# Patient Record
Sex: FEMALE | Race: WHITE | NOT HISPANIC OR LATINO | Employment: FULL TIME | URBAN - METROPOLITAN AREA
[De-identification: names, ages, dates, MRNs, and addresses within clinical notes are randomized per-mention and may not be internally consistent; named-entity substitution may affect disease eponyms.]

---

## 2017-01-05 ENCOUNTER — GENERIC CONVERSION - ENCOUNTER (OUTPATIENT)
Dept: OTHER | Facility: OTHER | Age: 22
End: 2017-01-05

## 2017-01-20 RX ORDER — SODIUM CHLORIDE 9 MG/ML
20 INJECTION, SOLUTION INTRAVENOUS ONCE
Status: COMPLETED | OUTPATIENT
Start: 2017-01-21 | End: 2017-01-21

## 2017-01-21 ENCOUNTER — HOSPITAL ENCOUNTER (OUTPATIENT)
Dept: INFUSION CENTER | Facility: HOSPITAL | Age: 22
Discharge: HOME | End: 2017-01-21
Payer: COMMERCIAL

## 2017-01-21 VITALS
DIASTOLIC BLOOD PRESSURE: 70 MMHG | TEMPERATURE: 98.2 F | RESPIRATION RATE: 20 BRPM | HEART RATE: 82 BPM | WEIGHT: 121.03 LBS | SYSTOLIC BLOOD PRESSURE: 116 MMHG

## 2017-01-21 PROCEDURE — 96415 CHEMO IV INFUSION ADDL HR: CPT

## 2017-01-21 PROCEDURE — 96413 CHEMO IV INFUSION 1 HR: CPT

## 2017-01-21 RX ADMIN — INFLIXIMAB 271 MG: 100 INJECTION, POWDER, LYOPHILIZED, FOR SOLUTION INTRAVENOUS at 09:18

## 2017-01-21 RX ADMIN — SODIUM CHLORIDE 20 ML/HR: 0.9 INJECTION, SOLUTION INTRAVENOUS at 09:21

## 2017-01-21 NOTE — PLAN OF CARE
Problem: Potential for Falls  Goal: Patient will remain free of falls  INTERVENTIONS:  - Assess patient frequently for physical needs  - Identify cognitive and physical deficits and behaviors that affect risk of falls    - [facility] [place] fall precautions as indicated by assessment   - Educate patient/family on patient safety including physical limitations  - Instruct patient to call for assistance with activity based on assessment  - Modify environment to reduce risk of injury  - Consider OT/PT consult to assist with strengthening/mobility   Outcome: Progressing

## 2017-03-18 ENCOUNTER — HOSPITAL ENCOUNTER (OUTPATIENT)
Dept: INFUSION CENTER | Facility: HOSPITAL | Age: 22
Discharge: HOME | End: 2017-03-18
Payer: COMMERCIAL

## 2017-03-18 VITALS
DIASTOLIC BLOOD PRESSURE: 58 MMHG | WEIGHT: 123 LBS | SYSTOLIC BLOOD PRESSURE: 113 MMHG | RESPIRATION RATE: 18 BRPM | TEMPERATURE: 96.4 F | HEART RATE: 88 BPM

## 2017-03-18 LAB
ALBUMIN SERPL BCP-MCNC: 3.3 G/DL (ref 3.5–5)
ALP SERPL-CCNC: 61 U/L (ref 46–116)
ALT SERPL W P-5'-P-CCNC: 24 U/L (ref 12–78)
ANION GAP SERPL CALCULATED.3IONS-SCNC: 6 MMOL/L (ref 4–13)
AST SERPL W P-5'-P-CCNC: 17 U/L (ref 5–45)
BASOPHILS # BLD AUTO: 0.05 THOUSANDS/ΜL (ref 0–0.1)
BASOPHILS NFR BLD AUTO: 1 % (ref 0–1)
BILIRUB SERPL-MCNC: 0.22 MG/DL (ref 0.2–1)
BUN SERPL-MCNC: 10 MG/DL (ref 5–25)
CALCIUM SERPL-MCNC: 8.5 MG/DL (ref 8.3–10.1)
CHLORIDE SERPL-SCNC: 106 MMOL/L (ref 100–108)
CO2 SERPL-SCNC: 26 MMOL/L (ref 21–32)
CREAT SERPL-MCNC: 0.82 MG/DL (ref 0.6–1.3)
CRP SERPL QL: 10.6 MG/L
EOSINOPHIL # BLD AUTO: 0.1 THOUSAND/ΜL (ref 0–0.61)
EOSINOPHIL NFR BLD AUTO: 1 % (ref 0–6)
ERYTHROCYTE [DISTWIDTH] IN BLOOD BY AUTOMATED COUNT: 16.2 % (ref 11.6–15.1)
FERRITIN SERPL-MCNC: 4 NG/ML (ref 8–388)
GFR SERPL CREATININE-BSD FRML MDRD: >60 ML/MIN/1.73SQ M
GLUCOSE SERPL-MCNC: 81 MG/DL (ref 65–140)
HCT VFR BLD AUTO: 35.7 % (ref 34.8–46.1)
HGB BLD-MCNC: 11.3 G/DL (ref 11.5–15.4)
LYMPHOCYTES # BLD AUTO: 3.13 THOUSANDS/ΜL (ref 0.6–4.47)
LYMPHOCYTES NFR BLD AUTO: 41 % (ref 14–44)
MCH RBC QN AUTO: 26.1 PG (ref 26.8–34.3)
MCHC RBC AUTO-ENTMCNC: 31.7 G/DL (ref 31.4–37.4)
MCV RBC AUTO: 82 FL (ref 82–98)
MONOCYTES # BLD AUTO: 0.88 THOUSAND/ΜL (ref 0.17–1.22)
MONOCYTES NFR BLD AUTO: 12 % (ref 4–12)
NEUTROPHILS # BLD AUTO: 3.5 THOUSANDS/ΜL (ref 1.85–7.62)
NEUTS SEG NFR BLD AUTO: 45 % (ref 43–75)
NRBC BLD AUTO-RTO: 0 /100 WBCS
PLATELET # BLD AUTO: 272 THOUSANDS/UL (ref 149–390)
PMV BLD AUTO: 9.7 FL (ref 8.9–12.7)
POTASSIUM SERPL-SCNC: 3.9 MMOL/L (ref 3.5–5.3)
PROT SERPL-MCNC: 7.9 G/DL (ref 6.4–8.2)
RBC # BLD AUTO: 4.33 MILLION/UL (ref 3.81–5.12)
SODIUM SERPL-SCNC: 138 MMOL/L (ref 136–145)
WBC # BLD AUTO: 7.67 THOUSAND/UL (ref 4.31–10.16)

## 2017-03-18 PROCEDURE — 96415 CHEMO IV INFUSION ADDL HR: CPT

## 2017-03-18 PROCEDURE — 86140 C-REACTIVE PROTEIN: CPT | Performed by: INTERNAL MEDICINE

## 2017-03-18 PROCEDURE — 82728 ASSAY OF FERRITIN: CPT | Performed by: INTERNAL MEDICINE

## 2017-03-18 PROCEDURE — 80053 COMPREHEN METABOLIC PANEL: CPT | Performed by: INTERNAL MEDICINE

## 2017-03-18 PROCEDURE — 96413 CHEMO IV INFUSION 1 HR: CPT

## 2017-03-18 PROCEDURE — 85025 COMPLETE CBC W/AUTO DIFF WBC: CPT | Performed by: INTERNAL MEDICINE

## 2017-03-18 RX ORDER — SODIUM CHLORIDE 9 MG/ML
20 INJECTION, SOLUTION INTRAVENOUS ONCE
Status: COMPLETED | OUTPATIENT
Start: 2017-03-18 | End: 2017-03-18

## 2017-03-18 RX ADMIN — INFLIXIMAB 279.5 MG: 100 INJECTION, POWDER, LYOPHILIZED, FOR SOLUTION INTRAVENOUS at 09:59

## 2017-03-18 RX ADMIN — SODIUM CHLORIDE 20 ML/HR: 0.9 INJECTION, SOLUTION INTRAVENOUS at 09:45

## 2017-04-17 ENCOUNTER — ALLSCRIPTS OFFICE VISIT (OUTPATIENT)
Dept: OTHER | Facility: OTHER | Age: 22
End: 2017-04-17

## 2017-05-01 ENCOUNTER — GENERIC CONVERSION - ENCOUNTER (OUTPATIENT)
Dept: OTHER | Facility: OTHER | Age: 22
End: 2017-05-01

## 2017-05-12 RX ORDER — SODIUM CHLORIDE 9 MG/ML
20 INJECTION, SOLUTION INTRAVENOUS CONTINUOUS
Status: DISCONTINUED | OUTPATIENT
Start: 2017-05-13 | End: 2017-05-16 | Stop reason: HOSPADM

## 2017-05-13 ENCOUNTER — HOSPITAL ENCOUNTER (OUTPATIENT)
Dept: INFUSION CENTER | Facility: HOSPITAL | Age: 22
Discharge: HOME | End: 2017-05-13
Payer: COMMERCIAL

## 2017-05-13 VITALS
HEART RATE: 83 BPM | WEIGHT: 118.83 LBS | DIASTOLIC BLOOD PRESSURE: 58 MMHG | TEMPERATURE: 97.8 F | SYSTOLIC BLOOD PRESSURE: 102 MMHG | RESPIRATION RATE: 18 BRPM

## 2017-05-13 PROCEDURE — 96413 CHEMO IV INFUSION 1 HR: CPT

## 2017-05-13 PROCEDURE — 96415 CHEMO IV INFUSION ADDL HR: CPT

## 2017-05-13 RX ADMIN — INFLIXIMAB 269.5 MG: 100 INJECTION, POWDER, LYOPHILIZED, FOR SOLUTION INTRAVENOUS at 09:50

## 2017-05-13 RX ADMIN — SODIUM CHLORIDE 20 ML/HR: 0.9 INJECTION, SOLUTION INTRAVENOUS at 09:40

## 2017-05-22 ENCOUNTER — ALLSCRIPTS OFFICE VISIT (OUTPATIENT)
Dept: OTHER | Facility: OTHER | Age: 22
End: 2017-05-22

## 2017-07-06 RX ORDER — SODIUM CHLORIDE 9 MG/ML
20 INJECTION, SOLUTION INTRAVENOUS CONTINUOUS
Status: DISCONTINUED | OUTPATIENT
Start: 2017-07-08 | End: 2017-07-11 | Stop reason: HOSPADM

## 2017-07-08 ENCOUNTER — HOSPITAL ENCOUNTER (OUTPATIENT)
Dept: INFUSION CENTER | Facility: HOSPITAL | Age: 22
Discharge: HOME | End: 2017-07-08
Payer: COMMERCIAL

## 2017-07-08 VITALS
TEMPERATURE: 98.8 F | WEIGHT: 116.62 LBS | RESPIRATION RATE: 22 BRPM | SYSTOLIC BLOOD PRESSURE: 105 MMHG | DIASTOLIC BLOOD PRESSURE: 75 MMHG | HEART RATE: 82 BPM

## 2017-07-08 PROCEDURE — 96413 CHEMO IV INFUSION 1 HR: CPT

## 2017-07-08 PROCEDURE — 96415 CHEMO IV INFUSION ADDL HR: CPT

## 2017-07-08 RX ADMIN — SODIUM CHLORIDE 20 ML/HR: 0.9 INJECTION, SOLUTION INTRAVENOUS at 09:41

## 2017-07-08 RX ADMIN — INFLIXIMAB 264.5 MG: 100 INJECTION, POWDER, LYOPHILIZED, FOR SOLUTION INTRAVENOUS at 09:41

## 2017-07-08 NOTE — PLAN OF CARE
Problem: Potential for Falls  Goal: Patient will remain free of falls  INTERVENTIONS:  - Assess patient frequently for physical needs  -  Identify cognitive and physical deficits and behaviors that affect risk of falls    -  [place] fall precautions as indicated by assessment   - Educate patient/family on patient safety including physical limitations  - Instruct patient to call for assistance with activity based on assessment  - Modify environment to reduce risk of injury  - Consider OT/PT consult to assist with strengthening/mobility   Outcome: Progressing

## 2017-08-10 ENCOUNTER — GENERIC CONVERSION - ENCOUNTER (OUTPATIENT)
Dept: OTHER | Facility: OTHER | Age: 22
End: 2017-08-10

## 2017-09-02 ENCOUNTER — HOSPITAL ENCOUNTER (OUTPATIENT)
Dept: INFUSION CENTER | Facility: HOSPITAL | Age: 22
Discharge: HOME | End: 2017-09-02
Payer: COMMERCIAL

## 2017-09-02 VITALS
HEART RATE: 75 BPM | DIASTOLIC BLOOD PRESSURE: 62 MMHG | SYSTOLIC BLOOD PRESSURE: 95 MMHG | WEIGHT: 117.5 LBS | TEMPERATURE: 97.3 F | RESPIRATION RATE: 18 BRPM

## 2017-09-02 PROCEDURE — 96415 CHEMO IV INFUSION ADDL HR: CPT

## 2017-09-02 PROCEDURE — 96413 CHEMO IV INFUSION 1 HR: CPT

## 2017-09-02 RX ADMIN — INFLIXIMAB 266.5 MG: 100 INJECTION, POWDER, LYOPHILIZED, FOR SOLUTION INTRAVENOUS at 08:39

## 2017-10-26 RX ORDER — SODIUM CHLORIDE 9 MG/ML
20 INJECTION, SOLUTION INTRAVENOUS CONTINUOUS
Status: DISCONTINUED | OUTPATIENT
Start: 2017-10-28 | End: 2017-10-31 | Stop reason: HOSPADM

## 2017-10-28 ENCOUNTER — HOSPITAL ENCOUNTER (OUTPATIENT)
Dept: INFUSION CENTER | Facility: HOSPITAL | Age: 22
Discharge: HOME | End: 2017-10-28
Payer: COMMERCIAL

## 2017-10-28 VITALS
SYSTOLIC BLOOD PRESSURE: 114 MMHG | DIASTOLIC BLOOD PRESSURE: 57 MMHG | TEMPERATURE: 97.8 F | WEIGHT: 117.06 LBS | RESPIRATION RATE: 20 BRPM | HEART RATE: 70 BPM

## 2017-10-28 LAB
25(OH)D3 SERPL-MCNC: 23.1 NG/ML (ref 30–100)
ALBUMIN SERPL BCP-MCNC: 3.3 G/DL (ref 3.5–5)
ALP SERPL-CCNC: 60 U/L (ref 46–116)
ALT SERPL W P-5'-P-CCNC: 22 U/L (ref 12–78)
ANION GAP SERPL CALCULATED.3IONS-SCNC: 5 MMOL/L (ref 4–13)
AST SERPL W P-5'-P-CCNC: 40 U/L (ref 5–45)
BASOPHILS # BLD AUTO: 0.03 THOUSANDS/ΜL (ref 0–0.1)
BASOPHILS NFR BLD AUTO: 0 % (ref 0–1)
BILIRUB SERPL-MCNC: 0.46 MG/DL (ref 0.2–1)
BUN SERPL-MCNC: 9 MG/DL (ref 5–25)
CALCIUM SERPL-MCNC: 8.5 MG/DL (ref 8.3–10.1)
CHLORIDE SERPL-SCNC: 106 MMOL/L (ref 100–108)
CO2 SERPL-SCNC: 25 MMOL/L (ref 21–32)
CREAT SERPL-MCNC: 0.69 MG/DL (ref 0.6–1.3)
CRP SERPL QL: 5.3 MG/L
EOSINOPHIL # BLD AUTO: 0.08 THOUSAND/ΜL (ref 0–0.61)
EOSINOPHIL NFR BLD AUTO: 1 % (ref 0–6)
ERYTHROCYTE [DISTWIDTH] IN BLOOD BY AUTOMATED COUNT: 15.7 % (ref 11.6–15.1)
FERRITIN SERPL-MCNC: 6 NG/ML (ref 8–388)
GFR SERPL CREATININE-BSD FRML MDRD: 125 ML/MIN/1.73SQ M
GLUCOSE P FAST SERPL-MCNC: 76 MG/DL (ref 65–99)
GLUCOSE SERPL-MCNC: 76 MG/DL (ref 65–140)
HCT VFR BLD AUTO: 36.6 % (ref 34.8–46.1)
HGB BLD-MCNC: 12 G/DL (ref 11.5–15.4)
LYMPHOCYTES # BLD AUTO: 3.06 THOUSANDS/ΜL (ref 0.6–4.47)
LYMPHOCYTES NFR BLD AUTO: 39 % (ref 14–44)
MCH RBC QN AUTO: 27.4 PG (ref 26.8–34.3)
MCHC RBC AUTO-ENTMCNC: 32.8 G/DL (ref 31.4–37.4)
MCV RBC AUTO: 84 FL (ref 82–98)
MONOCYTES # BLD AUTO: 0.67 THOUSAND/ΜL (ref 0.17–1.22)
MONOCYTES NFR BLD AUTO: 9 % (ref 4–12)
NEUTROPHILS # BLD AUTO: 3.97 THOUSANDS/ΜL (ref 1.85–7.62)
NEUTS SEG NFR BLD AUTO: 51 % (ref 43–75)
NRBC BLD AUTO-RTO: 0 /100 WBCS
PLATELET # BLD AUTO: 345 THOUSANDS/UL (ref 149–390)
PMV BLD AUTO: 9.4 FL (ref 8.9–12.7)
POTASSIUM SERPL-SCNC: 5 MMOL/L (ref 3.5–5.3)
PROT SERPL-MCNC: 8.6 G/DL (ref 6.4–8.2)
RBC # BLD AUTO: 4.38 MILLION/UL (ref 3.81–5.12)
SODIUM SERPL-SCNC: 136 MMOL/L (ref 136–145)
WBC # BLD AUTO: 7.82 THOUSAND/UL (ref 4.31–10.16)

## 2017-10-28 PROCEDURE — 96415 CHEMO IV INFUSION ADDL HR: CPT

## 2017-10-28 PROCEDURE — 82306 VITAMIN D 25 HYDROXY: CPT | Performed by: INTERNAL MEDICINE

## 2017-10-28 PROCEDURE — 80053 COMPREHEN METABOLIC PANEL: CPT | Performed by: INTERNAL MEDICINE

## 2017-10-28 PROCEDURE — 96413 CHEMO IV INFUSION 1 HR: CPT

## 2017-10-28 PROCEDURE — 82728 ASSAY OF FERRITIN: CPT | Performed by: INTERNAL MEDICINE

## 2017-10-28 PROCEDURE — 85025 COMPLETE CBC W/AUTO DIFF WBC: CPT | Performed by: INTERNAL MEDICINE

## 2017-10-28 PROCEDURE — 86140 C-REACTIVE PROTEIN: CPT | Performed by: INTERNAL MEDICINE

## 2017-10-28 RX ADMIN — SODIUM CHLORIDE 20 ML/HR: 0.9 INJECTION, SOLUTION INTRAVENOUS at 08:15

## 2017-10-28 RX ADMIN — INFLIXIMAB 265.5 MG: 100 INJECTION, POWDER, LYOPHILIZED, FOR SOLUTION INTRAVENOUS at 09:01

## 2017-10-28 NOTE — PLAN OF CARE
Problem: Potential for Falls  Goal: Patient will remain free of falls  INTERVENTIONS:  - Assess patient frequently for physical needs  -  Identify cognitive and physical deficits and behaviors that affect risk of falls    -  [place] fall precautions as indicated by assessment   - Educate patient/family on patient safety including physical limitations  - Instruct patient to call for assistance with activity based on assessment  - Modify environment to reduce risk of injury  - Consider OT/PT consult to assist with strengthening/mobility   Outcome: Progressing

## 2017-12-14 ENCOUNTER — GENERIC CONVERSION - ENCOUNTER (OUTPATIENT)
Dept: FAMILY MEDICINE CLINIC | Facility: CLINIC | Age: 22
End: 2017-12-14

## 2017-12-22 RX ORDER — SODIUM CHLORIDE 9 MG/ML
20 INJECTION, SOLUTION INTRAVENOUS ONCE
Status: COMPLETED | OUTPATIENT
Start: 2017-12-23 | End: 2017-12-23

## 2017-12-23 ENCOUNTER — HOSPITAL ENCOUNTER (OUTPATIENT)
Dept: INFUSION CENTER | Facility: HOSPITAL | Age: 22
Discharge: HOME | End: 2017-12-25
Payer: COMMERCIAL

## 2017-12-23 VITALS
SYSTOLIC BLOOD PRESSURE: 109 MMHG | HEART RATE: 70 BPM | TEMPERATURE: 96.7 F | RESPIRATION RATE: 16 BRPM | DIASTOLIC BLOOD PRESSURE: 63 MMHG

## 2017-12-23 PROCEDURE — 96415 CHEMO IV INFUSION ADDL HR: CPT

## 2017-12-23 PROCEDURE — 96413 CHEMO IV INFUSION 1 HR: CPT

## 2017-12-23 RX ADMIN — SODIUM CHLORIDE 20 ML/HR: 0.9 INJECTION, SOLUTION INTRAVENOUS at 08:50

## 2017-12-23 RX ADMIN — INFLIXIMAB 266.5 MG: 100 INJECTION, POWDER, LYOPHILIZED, FOR SOLUTION INTRAVENOUS at 09:06

## 2018-01-13 NOTE — PROGRESS NOTES
Assessment    1  Encounter for preventive health examination (V70 0) (Z00 00)    Plan  Contraception management    · Norethin Ace-Eth Estrad-FE 1-20 MG-MCG Oral Tablet    Discussion/Summary  health maintenance visit Currently, she eats a healthy diet and has an adequate exercise regimen  next cervical cancer screening is due 3 years cervical cancer screening is managed by [facility] Breast cancer screening: breast cancer screening is not indicated  Colorectal cancer screening: colorectal cancer screening is not indicated  Osteoporosis screening: bone mineral density testing is not indicated  The immunizations are up to date  Advice and education were given regarding nutrition, aerobic exercise, reproductive health and contraception  Patient discussion: discussed with the patient  1  Health maintenance  -doing well, no acute concerns  -UTD on vaccines, UTD on pap test   -follow up in 1 year for annual physical or sooner if problems arise  The patient was counseled regarding instructions for management, risk factor reductions, patient and family education, impressions, risks and benefits of treatment options, importance of compliance with treatment  The treatment plan was reviewed with the patient/guardian  The patient/guardian understands and agrees with the treatment plan     Self Referrals: Yes [facility]      Chief Complaint  annual physical      History of Present Illness  HM, Adult Female: The patient is being seen for a health maintenance evaluation  General Health: The patient's health since the last visit is described as good  She has regular dental visits  She denies vision problems  She denies hearing loss  Immunizations status: up to date   HPV through the office a few years ago  Lifestyle:  She consumes a diverse and healthy diet  She does not have any weight concerns  She exercises regularly  She does not use tobacco  She denies alcohol use  She denies drug use  Reproductive health: the patient is premenopausal   she reports normal menses  she uses contraception  she is sexually active  she denies prior pregnancies G 0P 0, 0, 0, 0  Screening: cancer screening reviewed and current  HPI: Patient is a pleasant 25 yo F presenting for annual physical  Patient has no acute complaints - denies CP, SOB, n/v/c/d, HA, or dizziness  She follows with [facility] for her GYN care, had a pap test earlier this year  She denies family h/o breast, ovarian, and colon cancer  UTD on vaccines  Exercises regularly and eats balanced diet  Patient is sexually active and takes OCPs for birth control  Patient has a h/o of iron deficiency anemia in which she takes iron supplement daily and takes Flonase for seasonal allergies  Otherwise, she takes no other medications  Review of Systems    Constitutional: no fever and no chills  Cardiovascular: no chest pain and no palpitations  Respiratory: no shortness of breath, no cough, no wheezing and no shortness of breath during exertion  Gastrointestinal: no abdominal pain, no nausea, no constipation and no diarrhea  Genitourinary: no dysmenorrhea and no unexplained vaginal bleeding  Musculoskeletal: no arthralgias and no myalgias  Integumentary: no rashes and no skin lesions  Neurological: no headache, no numbness, no fainting and no difficulty walking  ROS reviewed  Active Problems    1  Acute sinus infection (461 9) (J01 90)   2  Asthma (493 90) (J45 909)   3  Congested (478 19) (R09 81)   4  Contraception management (V25 9) (Z30 9)   5  Crohn's disease (555 9) (K50 90)   6  Encounter for repeat prescription of oral contraceptives (V25 01) (Z30 41)   7  Enlarged lymph node (785 6) (R59 9)   8  Eustachian tube dysfunction (381 81) (H69 80)   9  Impetigo (684) (L01 00)   10  Need for vaccination with 13-polyvalent pneumococcal conjugate vaccine (V03 82) (Z23)   11  Oral contraceptive use (V25 41) (Z30 41)   12  Tuberculosis screening (V74 1) (Z11 1)   13  URTI (acute upper respiratory infection) (465 9) (J06 9)    Past Medical History    · History of Abrasion Of The Left Cornea (918 1)   · History of Asthma (493 90) (J45 909)   · History of Cough (786 2) (R05)   · History of Dry Eye Syndrome Of The Right Eye (375 15)   · Encounter for repeat prescription of oral contraceptives (V25 01) (Z30 41)   · History of Crohn's disease (V12 70) (Z87 19)   · History of dermatitis (V13 3) (Z87 2)    Surgical History    · Denied: History Of Prior Surgery    Family History  Mother    · Family history of Crohn's (Granulomatous) Colitis  Father    · Family history of Spinal Stenosis    Social History    · Never A Smoker   · Never Drank Alcohol   · Oral contraceptive use (V25 41) (Z30 41)    Current Meds   1  Ferrous Sulfate 325 (65 Fe) MG Oral Tablet; TAKE 1 TABLET DAILY AS DIRECTED; Therapy: 46WEZ1230 to (Evaluate:18Nov2017) Recorded   2  Fluticasone Propionate 50 MCG/ACT Nasal Suspension; USE 1 TO 2 SPRAYS IN EACH   NOSTRIL ONCE DAILY; Therapy: 81BPF7137 to (Last Rx:07Mpr8473)  Requested for: 56Ped0102 Ordered   3  Microgestin FE 1/20 1-20 MG-MCG Oral Tablet; take (1) tablet daily; Therapy: 11Aug2015 to (Last Rx:23Gzs9301)  Requested for: 72QGI6422 Ordered   4  Norethin Ace-Eth Estrad-FE 1-20 MG-MCG Oral Tablet; TAKE 1 TABLET DAILY FOR 21   DAYS, THEN 7 TABLET-FREE DAYS; REPEAT; Therapy: 79IZW3706 to (490 41 485)  Requested for: 31Oct2016; Last   Rx:[id number] Ordered    Allergies    1  Sulfa Drugs    Vitals   Recorded: 98PBR6331 05:17PM   Temperature 98 4 F, Tympanic   Heart Rate 72   Respiration 14   Systolic 042, RUE, Sitting   Diastolic 60, RUE, Sitting   Height 5 ft 2 in   Weight 117 lb 8 oz   BMI Calculated 21 49   BSA Calculated 1 52   Pain Scale 0     Physical Exam    Constitutional   General appearance: No acute distress, well appearing and well nourished      Head and Face   Head and face: Normal     Eyes Conjunctiva and lids: No swelling, erythema or discharge  Ears, Nose, Mouth, and Throat   External inspection of ears and nose: Normal     Lips, teeth, and gums: Normal, good dentition  Oropharynx: Normal with no erythema, edema, exudate or lesions  Neck   Neck: Supple, symmetric, trachea midline, no masses  Cardiovascular   Auscultation of heart: Normal rate and rhythm, normal S1 and S2, no murmurs  Pedal pulses: 2+ bilaterally  Peripheral vascular exam: Normal     Examination of extremities for edema and/or varicosities: Normal     Abdomen   Abdomen: Non-tender, no masses  Lymphatic   Palpation of lymph nodes in neck: No lymphadenopathy  Skin   Skin and subcutaneous tissue: Normal without rashes or lesions  Psychiatric   Judgment and insight: Normal     Orientation to person, place, and time: Normal     Recent and remote memory: Intact  Mood and affect: Normal        Attending Note  Attending Note: Attending Note: I discussed the case with the Resident and reviewed the Resident's note  Level of Participation: I was present in clinic, but did not examine the patient  I agree with the Resident's note        Signatures   Electronically signed by : Mayte Spencer DO; May 24 2017  5:09AM EST                       (Author)    Electronically signed by : LARRY Zamora ; May 30 2017  5:31PM EST                       (Review)

## 2018-01-14 VITALS
TEMPERATURE: 99.2 F | DIASTOLIC BLOOD PRESSURE: 60 MMHG | SYSTOLIC BLOOD PRESSURE: 110 MMHG | BODY MASS INDEX: 21.42 KG/M2 | HEART RATE: 78 BPM | HEIGHT: 62 IN | RESPIRATION RATE: 16 BRPM | WEIGHT: 116.38 LBS

## 2018-01-14 NOTE — PROGRESS NOTES
Assessment    1  Crohn's disease (555 9) (K50 90)   2  Tuberculosis screening (V74 1) (Z11 1)   3  Encounter for preventive health examination (V70 0) (Z00 00)   4  Need for vaccination with 13-polyvalent pneumococcal conjugate vaccine (V03 82) (Z23)    Plan  Need for vaccination with 13-polyvalent pneumococcal conjugate vaccine    · Prevnar 13 Intramuscular Suspension    Discussion/Summary  health maintenance visit Currently, she eats a healthy diet  Will be due when she is 21 Advice and education were given regarding nutrition, aerobic exercise, calcium supplements, vitamin D supplements, reproductive health and contraception  Patient discussion: discussed with the patient  20 year for yearly physical  Anemia: HB 10 7 in April is currently on Iron 325 mg twice daily, advised Colace if she gets constipated  She is scheduled to get her Iron studies done today  Crohns: Follows with GI is currently on Remicade  Will get her PPD and get her Pneumococcal vaccine Prevnar 13 as she is on a immunosuppressant medication  She will get her Pneumovax 23 in 1 year and a booster of Pneumovax in 5 years  Possible side effects of new medications were reviewed with the patient/guardian today  The was counseled regarding prognosis, patient and family education, impressions, risks and benefits of treatment options, importance of compliance with treatment  Chief Complaint  Yearly Physical      History of Present Illness  HPI: 21year old for yearly physical   History of Crohns needs PPD every year, currently on Remicade  On birth control pills and takes iron for her anemia  Last cbc done April 20 th HB was 10 7 is scheduled to get her iron studies done later today  Review of Systems    Constitutional: no fever  Eyes: no eye pain  ENT: no earache  Cardiovascular: the heart rate was not slow and no chest pain  Respiratory: no shortness of breath and no cough     Gastrointestinal: no abdominal pain and no nausea  Genitourinary: no dysuria and no pelvic pain  Integumentary: no rashes and no skin lesions  Neurological: no headache  Psychiatric: not suicidal    Endocrine: no proptosis  Hematologic/Lymphatic: no swollen glands and no tendency for easy bleeding  Active Problems    1  Asthma (493 90) (J45 909)   2  Congested (478 19) (R09 81)   3  Contraception management (V25 9) (Z30 9)   4  Crohn's disease (555 9) (K50 90)   5  Encounter for repeat prescription of oral contraceptives (V25 01) (Z30 41)   6  Enlarged lymph node (785 6) (R59 9)   7  Eustachian tube dysfunction (381 81) (H69 80)   8  Impetigo (684) (L01 00)   9  URTI (acute upper respiratory infection) (465 9) (J06 9)    Past Medical History    · History of Abrasion Of The Left Cornea (918 1)   · History of Asthma (493 90) (J45 909)   · History of Cough (786 2) (R05)   · History of Dry Eye Syndrome Of The Right Eye (375 15)   · Encounter for repeat prescription of oral contraceptives (V25 01) (Z30 41)   · History of Crohn's disease (V12 70) (Z87 19)   · History of dermatitis (V13 3) (Z87 2)    Surgical History    · Denied: History Of Prior Surgery    Family History  Mother    · Family history of Crohn's (Granulomatous) Colitis  Father    · Family history of Spinal Stenosis    Social History    · Never A Smoker   · Never Drank Alcohol    Current Meds   1  Fluticasone Propionate 50 MCG/ACT Nasal Suspension; USE 1 TO 2 SPRAYS IN EACH   NOSTRIL ONCE DAILY; Therapy: 27URE9326 to (Last Rx:21Nwf5258)  Requested for: 07Dec2015 Ordered   2  Microgestin FE 1/20 1-20 MG-MCG Oral Tablet; take (1) tablet daily; Therapy: 11Aug2015 to (Last Rx:11Aug2015)  Requested for: 11Aug2015 Ordered   3  Microgestin FE 1/20 1-20 MG-MCG Oral Tablet; TAKE (1) TABLET DAILY; Therapy: 88AVP3816 to (Evaluate:61Wnq2186)  Requested for: 11Aug2015; Last   Rx:11Aug2015 Ordered   4  Remicade 100 MG Intravenous Solution Reconstituted; USE AS DIRECTED;    Therapy: 20XAR1116 to (Last Rx:53Fzr9250) Ordered    Allergies    1  Sulfa Drugs    Vitals   Recorded: 25YZT4745 08:51AM   Temperature 97 8 F   Heart Rate 94   Respiration 16   Systolic 650   Diastolic 64   Height 5 ft 2 in   Weight 119 lb 2 08 oz   BMI Calculated 21 79   BSA Calculated 1 53   Pain Scale 0     Physical Exam    Constitutional   General appearance: No acute distress, well appearing and well nourished  Eyes   Pupils and irises: Equal, round, reactive to light  Ears, Nose, Mouth, and Throat   Otoscopic examination: Tympanic membranes translucent with normal light reflex  Canals patent without erythema  Nasal mucosa, septum, and turbinates: Normal without edema or erythema  Neck   Thyroid: Normal, no thyromegaly  Pulmonary   Auscultation of lungs: Clear to auscultation  Cardiovascular   Auscultation of heart: Normal rate and rhythm, normal S1 and S2, no murmurs  Abdomen   Abdomen: Non-tender, no masses  Musculoskeletal   Gait and station: Normal     Skin   Skin and subcutaneous tissue: Normal without rashes or lesions  Psychiatric   Judgment and insight: Normal     Orientation to person, place, and time: Normal     Recent and remote memory: Intact  Mood and affect: Normal        Attending Note  Attending Note: Attending Note: I discussed the case with the Resident and reviewed the Resident's note  Level of Participation: I was present in clinic, but did not examine the patient  I agree with the Resident's note  I agree with the Resident's note except Anemia, monitor CBC on therapy and adjust medication        Signatures   Electronically signed by : Aisha Flores, ; May 13 2016  9:18AM EST                       (Author)    Electronically signed by : LARRY Navarrete ; May 24 2016  4:05PM EST                       (Review)

## 2018-01-18 NOTE — PROGRESS NOTES
Assessment    1  Enlarged lymph node (785 6) (R59 9)    Discussion/Summary  Discussion Summary:   You have a isolated swollen lymph node  This can be a sign of a virus of infection  You can take Motrin for the pain and discomfort  Please follow up with your PCP in the next 2-3 days or sooner if symptoms become worse  You also have a small sore on your ear just below your ear lobe  Continue using the triple antibiotic ointment  If you develop fevers, ear pain, fatigue, nights sweats or chills please go to ER  Medication Side Effects Reviewed: Possible side effects of new medications were reviewed with the patient/guardian today  Understands and agrees with treatment plan: The treatment plan was reviewed with the patient/guardian  The patient/guardian understands and agrees with the treatment plan   Counseling Documentation With Imm: The patient's family was counseled regarding instructions for management, patient and family education, importance of compliance with treatment  Follow Up Instructions: Follow Up with your Primary Care Provider in 2-3 days  If your symptoms worsen, go to the nearest [place] [address] Emergency Department  Chief Complaint  Chief Complaint Free Text Note Form: pt reports she has a swollen lymph node on the left side of her neck for 3 days   unable to lie on that side to sleep      History of Present Illness  HPI: This is a 21year old female here today for swollen lymph  She noticed the swollen lymph node about 3 days ago  She denies any other symptoms including nasal congestion, cough, ear pain, sore throat, SOB, fatigue, night sweats  She states she otherwise feels well  She does have sore below her lobe which she states is from wearing fake earrings she noticed this 1 day ago  She has had this before and using triple antibiotic ointment  She has not tried anything for the swollen lymph node  No other swollen glands     Hospital Based Practices Required Assessment:   Pain Assessment   the patient states they have pain  The pain is located in the lymph node  The patient describes the pain as aching  (on a scale of 0 to 10, the patient rates the pain at 5 )   Abuse And Domestic Violence Screen    Yes, the patient is safe at home  The patient states no one is hurting them  Depression And Suicide Screen  No, the patient has not had thoughts of hurting themself  No, the patient has not felt depressed in the past 7 days  Prefered Language is  [place]  Primary Language is  English  Review of Systems  Focused-Female:   Constitutional: No fever, no chills, feels well, no tiredness, no recent weight gain or loss  ENT: no ear ache, no loss of hearing, no nosebleeds or nasal discharge, no sore throat or hoarseness  Cardiovascular: no complaints of slow or fast heart rate, no chest pain, no palpitations, no leg claudication or lower extremity edema  Respiratory: no complaints of shortness of breath, no wheezing, no dyspnea on exertion, no orthopnea or PND  Musculoskeletal: no complaints of arthralgia, no myalgia, no joint swelling or stiffness, no limb pain or swelling  ROS Reviewed:   ROS reviewed  Active Problems    1  Asthma (493 90) (J45 909)   2  Congested (478 19) (R09 81)   3  Contraception management (V25 9) (Z30 9)   4  Crohn's disease (555 9) (K50 90)   5  Encounter for repeat prescription of oral contraceptives (V25 01) (Z30 41)   6  Eustachian tube dysfunction (381 81) (H69 80)   7  Impetigo (684) (L01 00)   8  URTI (acute upper respiratory infection) (465 9) (J06 9)    Past Medical History    1  History of Abrasion Of The Left Cornea (918 1)   2  History of Asthma (493 90) (J45 909)   3  History of Cough (786 2) (R05)   4  History of Dry Eye Syndrome Of The Right Eye (375 15)   5  Encounter for repeat prescription of oral contraceptives (V25 01) (Z30 41)   6  History of Crohn's disease (V12 70) (Z87 19)   7   History of dermatitis (V13 3) (Z87 2)  Active Problems And Past Medical History Reviewed: The active problems and past medical history were reviewed and updated today  Family History    1  Family history of Crohn's (Granulomatous) Colitis    2  Family history of Spinal Stenosis  Family History Reviewed: The family history was reviewed and updated today  Social History    · Never A Smoker   · Never Drank Alcohol  Social History Reviewed: The social history was reviewed and updated today  Surgical History    1  Denied: History Of Prior Surgery  Surgical History Reviewed: The surgical history was reviewed and updated today  Current Meds   1  Fluticasone Propionate 50 MCG/ACT Nasal Suspension; USE 1 TO 2 SPRAYS IN EACH   NOSTRIL ONCE DAILY; Therapy: 69QLW6600 to (Last Rx:69Kik7166)  Requested for: 89Vih7177 Ordered   2  Microgestin FE 1/20 1-20 MG-MCG Oral Tablet; take (1) tablet daily; Therapy: 89Yct5658 to (Last Rx:11Aug2015)  Requested for: 11Aug2015 Ordered   3  Microgestin FE 1/20 1-20 MG-MCG Oral Tablet; TAKE (1) TABLET DAILY; Therapy: 44UVY5067 to (Evaluate:77Yyx5304)  Requested for: 11Aug2015; Last   Rx:11Aug2015 Ordered   4  Remicade 100 MG Intravenous Solution Reconstituted; USE AS DIRECTED; Therapy: 57WMB0970 to (Last YR:86PCK6157) Ordered  Medication List Reviewed: The medication list was reviewed and updated today  Allergies    1  Sulfa Drugs    Vitals  Signs [Data Includes: Current Encounter]   Recorded: 15XGO8315 04:03PM   Temperature: 97 8 F, Tympanic  Heart Rate: 84  Respiration: 16  Systolic: 073, LUE, Sitting  Diastolic: 78, LUE, Sitting  Height: 5 ft 2 in  Weight: 115 lb   BMI Calculated: 21 03  BSA Calculated: 1 51  O2 Saturation: 97, RA  Pain Scale: 5/10    Physical Exam    Constitutional   General appearance: No acute distress, well appearing and well nourished  Ears, Nose, Mouth, and Throat small sore just below lobula  Crusted and dry     Otoscopic examination: Tympanic membranes translucent with normal light reflex  Canals patent without erythema  Nasal mucosa, septum, and turbinates: Normal without edema or erythema  Oropharynx: Normal with no erythema, edema, exudate or lesions  Pulmonary   Respiratory effort: No increased work of breathing or signs of respiratory distress  Auscultation of lungs: Clear to auscultation  Cardiovascular   Auscultation of heart: Normal rate and rhythm, normal S1 and S2, without murmurs      Lymphatic   Palpation of lymph nodes in neck: Abnormal   L posterior auricular swollen lymph node, soft and mobile, no other swollen lymph nodes on physical exam    Psychiatric   Orientation to person, place, and time: Normal     Mood and affect: Normal        Signatures   Electronically signed by : Rhoda Prasad; Mar 19 2016  6:32PM EST                       (Author)    Electronically signed by : Maricarmen Correa DO; Mar 21 2016  7:15AM EST                       (Co-author)

## 2018-01-22 VITALS
SYSTOLIC BLOOD PRESSURE: 100 MMHG | DIASTOLIC BLOOD PRESSURE: 60 MMHG | HEART RATE: 72 BPM | BODY MASS INDEX: 21.62 KG/M2 | RESPIRATION RATE: 14 BRPM | WEIGHT: 117.5 LBS | TEMPERATURE: 98.4 F | HEIGHT: 62 IN

## 2018-02-05 DIAGNOSIS — Z30.9 ENCOUNTER FOR CONTRACEPTIVE MANAGEMENT, UNSPECIFIED TYPE: Primary | ICD-10-CM

## 2018-02-06 RX ORDER — NORETHINDRONE ACETATE AND ETHINYL ESTRADIOL 1; .02 MG/1; MG/1
1 TABLET ORAL DAILY
Qty: 21 TABLET | Refills: 2 | Status: SHIPPED | OUTPATIENT
Start: 2018-02-06 | End: 2018-02-13 | Stop reason: SDUPTHER

## 2018-02-13 DIAGNOSIS — Z30.9 ENCOUNTER FOR CONTRACEPTIVE MANAGEMENT, UNSPECIFIED TYPE: ICD-10-CM

## 2018-02-13 RX ORDER — NORETHINDRONE ACETATE AND ETHINYL ESTRADIOL 1; .02 MG/1; MG/1
1 TABLET ORAL DAILY
Qty: 21 TABLET | Refills: 0 | Status: SHIPPED | OUTPATIENT
Start: 2018-02-13 | End: 2018-03-04 | Stop reason: SDUPTHER

## 2018-02-14 ENCOUNTER — CLINICAL SUPPORT (OUTPATIENT)
Dept: FAMILY MEDICINE CLINIC | Facility: CLINIC | Age: 23
End: 2018-02-14
Payer: COMMERCIAL

## 2018-02-14 DIAGNOSIS — Z23 NEED FOR TUBERCULOSIS VACCINATION: Primary | ICD-10-CM

## 2018-02-14 PROCEDURE — 86580 TB INTRADERMAL TEST: CPT

## 2018-02-16 ENCOUNTER — CLINICAL SUPPORT (OUTPATIENT)
Dept: FAMILY MEDICINE CLINIC | Facility: CLINIC | Age: 23
End: 2018-02-16

## 2018-02-16 DIAGNOSIS — Z11.1 TUBERCULOSIS SCREENING: Primary | ICD-10-CM

## 2018-02-16 LAB
INDURATION: NORMAL MM
TB SKIN TEST: NEGATIVE

## 2018-03-04 DIAGNOSIS — Z30.9 ENCOUNTER FOR CONTRACEPTIVE MANAGEMENT, UNSPECIFIED TYPE: ICD-10-CM

## 2018-03-04 RX ORDER — NORETHINDRONE ACETATE AND ETHINYL ESTRADIOL 1; 20 MG/1; UG/1
1 TABLET ORAL DAILY
Qty: 21 TABLET | Refills: 0 | Status: SHIPPED | OUTPATIENT
Start: 2018-03-04 | End: 2019-09-16

## 2018-03-04 NOTE — TELEPHONE ENCOUNTER
Please reach out to patient regarding her birth control and who is managing/refilling it  She follows up with [facility] and last time she saw them was February, 2018  OB/GYN should be managing, but this is the second refill request for it  I will approve it for this month so that she is not without it, but it needs to be clarified  Thank you!

## 2018-04-13 ENCOUNTER — LAB REQUISITION (OUTPATIENT)
Dept: LAB | Facility: HOSPITAL | Age: 23
End: 2018-04-13
Payer: COMMERCIAL

## 2018-04-13 DIAGNOSIS — K50.00 CROHN'S DISEASE OF SMALL INTESTINE WITHOUT COMPLICATION (HCC): ICD-10-CM

## 2018-04-13 DIAGNOSIS — K50.10 CROHN'S DISEASE OF LARGE INTESTINE WITHOUT COMPLICATION (HCC): ICD-10-CM

## 2018-04-13 LAB — CRP SERPL QL: 11.9 MG/L

## 2018-04-13 PROCEDURE — 86140 C-REACTIVE PROTEIN: CPT | Performed by: INTERNAL MEDICINE

## 2018-11-16 ENCOUNTER — OFFICE VISIT (OUTPATIENT)
Dept: URGENT CARE | Age: 23
End: 2018-11-16
Payer: COMMERCIAL

## 2018-11-16 VITALS
BODY MASS INDEX: 24.74 KG/M2 | RESPIRATION RATE: 16 BRPM | OXYGEN SATURATION: 100 % | SYSTOLIC BLOOD PRESSURE: 119 MMHG | HEART RATE: 70 BPM | DIASTOLIC BLOOD PRESSURE: 71 MMHG | TEMPERATURE: 99 F | WEIGHT: 126 LBS | HEIGHT: 60 IN

## 2018-11-16 DIAGNOSIS — L25.9 CONTACT DERMATITIS, UNSPECIFIED CONTACT DERMATITIS TYPE, UNSPECIFIED TRIGGER: Primary | ICD-10-CM

## 2018-11-16 PROCEDURE — 99203 OFFICE O/P NEW LOW 30 MIN: CPT | Performed by: PHYSICIAN ASSISTANT

## 2018-11-16 RX ORDER — FERROUS SULFATE 325(65) MG
1 TABLET ORAL DAILY
COMMUNITY
Start: 2017-05-22 | End: 2019-09-16

## 2018-11-16 RX ORDER — NORETHINDRONE ACETATE AND ETHINYL ESTRADIOL 1MG-20(21)
1 KIT ORAL
COMMUNITY
Start: 2018-04-10 | End: 2019-09-16

## 2018-11-16 NOTE — PROGRESS NOTES
[address]        NAME: Alex Pathak is a 25 y o  female  : 1995    MRN: [id number]  DATE: 2018  TIME: 5:02 PM    Assessment and Plan   Contact dermatitis, unspecified contact dermatitis type, unspecified trigger [L25 9]  1  Contact dermatitis, unspecified contact dermatitis type, unspecified trigger  hydrocortisone 2 5 % cream         Patient Instructions     Use cream as directed  Benadryl as needed  Follow up with PCP in 3-5 days  Proceed to  ER if symptoms worsen  Chief Complaint     Chief Complaint   Patient presents with    Rash     PT states she first noticed a rash on her right hand, very itchy and slight swelling, tried benadryl ointment but no relief         History of Present Illness       80-year-old female presents with rash on hand  Patient reports that it started yesterday  She applied some Benadryl cream to the area which helped temporarily but the itching came right back  Denies any new lotions creams or soaps  No fevers chills nausea vomiting diarrhea  No pain to the area  Rash   This is a new problem  The current episode started yesterday  The problem is unchanged  The affected locations include the right hand  The rash is characterized by redness and itchiness  She was exposed to nothing  Pertinent negatives include no cough, diarrhea, fever, shortness of breath, sore throat or vomiting  Treatments tried: Benadryl cream  The treatment provided no relief  Review of Systems   Review of Systems   Constitutional: Negative  Negative for fever  HENT: Negative  Negative for sore throat  Eyes: Negative  Respiratory: Negative  Negative for cough and shortness of breath  Cardiovascular: Negative  Gastrointestinal: Negative  Negative for diarrhea and vomiting  Musculoskeletal: Negative  Skin: Positive for rash  Neurological: Negative            Current Medications       Current Outpatient Prescriptions:     ferrous sulfate 325 (65 Fe) mg tablet, Take 1 tablet by mouth daily, Disp: , Rfl:     InFLIXimab (REMICADE IV), Infuse into a venous catheter, Disp: , Rfl:     JUNEL 1/20 1-20 MG-MCG per tablet, TAKE 1 TABLET BY MOUTH DAILY, Disp: 21 tablet, Rfl: 0    norethindrone-ethinyl estradiol (JUNEL FE 1/20) 1-20 MG-MCG per tablet, Take 1 tablet by mouth, Disp: , Rfl:     hydrocortisone 2 5 % cream, Apply topically 2 (two) times a day, Disp: 30 g, Rfl: 0    Polysaccharide Iron Complex (FERREX 150 PO), Take 150 mg by mouth daily, Disp: , Rfl:     Current Allergies     Allergies as of 11/16/2018 - Reviewed 11/16/2018   Allergen Reaction Noted    Sulfa antibiotics Rash 02/13/2016    Sulfasalazine Rash 02/13/2016            The following portions of the patient's history were reviewed and updated as appropriate: allergies, current medications, past family history, past medical history, past social history, past surgical history and problem list      Past Medical History:   Diagnosis Date    Asthma     Crohn's disease ([address] )     Dermatitis     Dry eye syndrome of right eye        Past Surgical History:   Procedure Laterality Date    NO PAST SURGERIES         Family History   Problem Relation Age of Onset    Crohn's disease Mother     Other Father         spinal stenosis         Medications have been verified  Objective   /71 (BP Location: Right arm, Patient Position: Sitting, Cuff Size: Adult)   Pulse 70   Temp 99 °F (37 2 °C) (Tympanic)   Resp 16   Ht 5' (1 524 m)   Wt 57 2 kg (126 lb)   LMP 10/23/2018   SpO2 100%   Breastfeeding? No   BMI 24 61 kg/m²        Physical Exam     Physical Exam   Constitutional: She is oriented to person, place, and time  She appears well-developed and well-nourished  No distress  HENT:   Head: Normocephalic and atraumatic     Right Ear: External ear normal    Left Ear: External ear normal    Nose: Nose normal    Mouth/Throat: Oropharynx is clear and moist  No oropharyngeal exudate  Eyes: Conjunctivae are normal  Right eye exhibits no discharge  Left eye exhibits no discharge  Neck: Normal range of motion  Neck supple  Cardiovascular: Normal rate, regular rhythm, normal heart sounds and intact distal pulses  No murmur heard  Pulmonary/Chest: Effort normal and breath sounds normal  No respiratory distress  She has no wheezes  She has no rales  Abdominal: Soft  Bowel sounds are normal  There is no tenderness  Musculoskeletal: Normal range of motion  Lymphadenopathy:     She has no cervical adenopathy  Neurological: She is alert and oriented to person, place, and time  Skin: Skin is warm and dry  Rash (Mild erythematous red rash noted on hand ) noted  Psychiatric: She has a normal mood and affect  Nursing note and vitals reviewed

## 2018-11-16 NOTE — PATIENT INSTRUCTIONS
Use cream as directed  Benadryl as needed  Follow up with PCP in 3-5 days  Proceed to  ER if symptoms worsen  Contact Dermatitis   AMBULATORY CARE:   Contact dermatitis  is a rash  It develops when you touch something that irritates your skin or causes an allergic reaction  Common signs and symptoms include the following:   · Red, swollen, painful rash           · Skin that itches, stings, or burns    · Dry, scaly, or crusty skin patches    · Bumps or blisters    · Fluid draining from blisters  Call 911 for any of the following:   · You have sudden trouble breathing  · Your throat swells and you have trouble eating  · Your face is swollen  Contact your healthcare provider if:   · You have a fever  · Your blisters are draining pus  · Your rash spreads or does not get better, even after treatment  · You have questions or concerns about your condition or care  Treatment for contact dermatitis  involves removing any irritants or allergens that cause your rash  You may also need medicines to decrease itching and swelling  They will be given as a topical medicine to apply to your rash or as a pill  Manage contact dermatitis:   · Take short baths or showers in cool water  Use mild soap or soap-free cleansers  Add oatmeal, baking soda, or cornstarch to the bath water to help decrease skin irritation  · Avoid skin irritants , such as makeup, hair products, soaps, and cleansers  Use products that do not contain perfume or dye  · Apply a cool compress to your rash  This will help soothe your skin  · Keep your skin moist   Rub unscented cream or lotion on your skin to prevent dryness and itching  Do this right after a bath or shower when your skin is still damp  Follow up with your healthcare provider as directed:  Write down your questions so you remember to ask them during your visits     © 2017 [address] Information is for End User's use only and may not be sold, redistributed or otherwise used for commercial purposes  All illustrations and images included in CareNotes® are the copyrighted property of A D A M [employer] Inc  or Hans Villagomez  The above information is an  only  It is not intended as medical advice for individual conditions or treatments  Talk to your doctor, nurse or pharmacist before following any medical regimen to see if it is safe and effective for you

## 2018-11-16 NOTE — LETTER
November 16, 2018     Patient: Rk Casanova   YOB: 1995   Date of Visit: 11/16/2018       To Whom it May Concern:    Jamaica Aguiar was seen in my clinic on 11/16/2018  She may return to work on 11/17/2018  If you have any questions or concerns, please don't hesitate to call  Sincerely,          Reymundo Laurent PA-C        CC: No Recipients

## 2019-07-23 NOTE — PROGRESS NOTES
Pt drug to come from [facility] pharmacy [phone] since the pt has access spoke to karine at dr [facility] Person office and she was sending the script to them today and will follow up with them

## 2019-07-26 ENCOUNTER — HOSPITAL ENCOUNTER (OUTPATIENT)
Dept: INFUSION CENTER | Facility: HOSPITAL | Age: 24
End: 2019-07-26

## 2019-08-02 ENCOUNTER — HOSPITAL ENCOUNTER (OUTPATIENT)
Dept: INFUSION CENTER | Facility: HOSPITAL | Age: 24
Discharge: HOME | End: 2019-08-02

## 2019-08-08 ENCOUNTER — HOSPITAL ENCOUNTER (OUTPATIENT)
Dept: INFUSION CENTER | Facility: HOSPITAL | Age: 24
Discharge: HOME | End: 2019-08-08
Payer: COMMERCIAL

## 2019-08-08 VITALS
WEIGHT: 125.44 LBS | RESPIRATION RATE: 20 BRPM | DIASTOLIC BLOOD PRESSURE: 66 MMHG | HEART RATE: 74 BPM | TEMPERATURE: 98 F | SYSTOLIC BLOOD PRESSURE: 118 MMHG | BODY MASS INDEX: 24.5 KG/M2

## 2019-08-08 PROCEDURE — 96413 CHEMO IV INFUSION 1 HR: CPT

## 2019-08-08 PROCEDURE — 96415 CHEMO IV INFUSION ADDL HR: CPT

## 2019-08-08 RX ORDER — SODIUM CHLORIDE 9 MG/ML
20 INJECTION, SOLUTION INTRAVENOUS ONCE
Status: COMPLETED | OUTPATIENT
Start: 2019-08-08 | End: 2019-08-08

## 2019-08-08 RX ADMIN — INFLIXIMAB 284.5 MG: 100 INJECTION, POWDER, LYOPHILIZED, FOR SOLUTION INTRAVENOUS at 08:13

## 2019-08-08 RX ADMIN — SODIUM CHLORIDE 20 ML/HR: 0.9 INJECTION, SOLUTION INTRAVENOUS at 08:00

## 2019-08-08 NOTE — PLAN OF CARE
Problem: Potential for Falls  Goal: Patient will remain free of falls  Description  INTERVENTIONS:  - Assess patient frequently for physical needs  -  Identify cognitive and physical deficits and behaviors that affect risk of falls    -  [place] fall precautions as indicated by assessment   - Educate patient/family on patient safety including physical limitations  - Instruct patient to call for assistance with activity based on assessment  - Modify environment to reduce risk of injury  - Consider OT/PT consult to assist with strengthening/mobility  Outcome: Progressing

## 2019-09-16 ENCOUNTER — OFFICE VISIT (OUTPATIENT)
Dept: FAMILY MEDICINE CLINIC | Facility: CLINIC | Age: 24
End: 2019-09-16

## 2019-09-16 ENCOUNTER — HOSPITAL ENCOUNTER (OUTPATIENT)
Dept: INFUSION CENTER | Facility: HOSPITAL | Age: 24
End: 2019-09-16

## 2019-09-16 VITALS
HEART RATE: 78 BPM | SYSTOLIC BLOOD PRESSURE: 100 MMHG | WEIGHT: 136.2 LBS | HEIGHT: 62 IN | TEMPERATURE: 99 F | DIASTOLIC BLOOD PRESSURE: 70 MMHG | BODY MASS INDEX: 25.06 KG/M2 | RESPIRATION RATE: 16 BRPM

## 2019-09-16 DIAGNOSIS — Z3A.29 29 WEEKS GESTATION OF PREGNANCY: ICD-10-CM

## 2019-09-16 DIAGNOSIS — J45.20 MILD INTERMITTENT ASTHMA WITHOUT COMPLICATION: ICD-10-CM

## 2019-09-16 DIAGNOSIS — Z00.00 ANNUAL PHYSICAL EXAM: Primary | ICD-10-CM

## 2019-09-16 DIAGNOSIS — K50.10 CROHN'S DISEASE OF COLON WITHOUT COMPLICATION (HCC): ICD-10-CM

## 2019-09-16 PROCEDURE — 99395 PREV VISIT EST AGE 18-39: CPT | Performed by: FAMILY MEDICINE

## 2019-09-16 RX ORDER — PNV NO.95/FERROUS FUM/FOLIC AC 28MG-0.8MG
1 TABLET ORAL DAILY
COMMUNITY

## 2019-09-16 NOTE — PROGRESS NOTES
[address] [facility] Pereira    NAME: Claudia Espinoza  AGE: 21 y o  SEX: female  : 1995     DATE: 2019     Assessment and Plan:     Problem List Items Addressed This Visit        Digestive    Crohn's disease of colon without complication ([address] )     Stable  Follows with Dr Kiel Webster with [facility]  Currently getting Remicade infusions every 8 weeks                Respiratory    Mild intermittent asthma without complication     Stable  Has not needed ventolin inhaler in multiple years  Never been hospitalized  Other    29 weeks gestation of pregnancy     Follows with [facility] and [facility]FM  Taking prenatal vitamin  Needs referral for Ob/gyn           Other Visit Diagnoses     Annual physical exam    -  Primary    Relevant Orders    Ambulatory referral to Obstetrics / Gynecology          Immunizations and preventive care screenings were discussed with patient today  Appropriate education was printed on patient's after visit summary  Counseling:  Alcohol/drug use: discussed moderation in alcohol intake, the recommendations for healthy alcohol use, and avoidance of illicit drug use  Dental Health: discussed importance of regular tooth brushing, flossing, and dental visits  Injury prevention: discussed safety/seat belts, safety helmets, smoke detectors, carbon dioxide detectors, and smoking near bedding or upholstery  Sexual health: discussed sexually transmitted diseases, partner selection, use of condoms, avoidance of unintended pregnancy, and contraceptive alternatives  · Exercise: the importance of regular exercise/physical activity was discussed  Recommend exercise 3-5 times per week for at least 30 minutes  Return in about 1 year (around 2020), or if symptoms worsen or fail to improve       Chief Complaint:     Chief Complaint   Patient presents with    Physical Exam      History of Present Illness:     Adult Annual Physical   Patient here for a comprehensive physical exam  The patient reports no problems  Diet and Physical Activity  · Diet/Nutrition: well balanced diet, limited junk food and limited fruits/vegetables  · Exercise: walking  Depression Screening  PHQ-9 Depression Screening    PHQ-9:    Frequency of the following problems over the past two weeks:            General Health  · Sleep: gets 7-8 hours of sleep on average  · Hearing: no problems  · Vision: no vision problems and wears glasses  · Dental: regular dental visits, brushes teeth once daily and flosses teeth occasionally  /GYN Health  · Last menstrual period: 2/2019  · Contraceptive method: currently pregnant  · History of STDs?: no      Review of Systems:     Review of Systems   Constitutional: Negative for activity change, chills and fever  HENT: Negative for sinus pressure and sore throat  Respiratory: Negative for cough, chest tightness, shortness of breath and wheezing  Cardiovascular: Negative for chest pain, palpitations and leg swelling  Gastrointestinal: Negative for abdominal pain, blood in stool, constipation, diarrhea, nausea and vomiting  Genitourinary: Negative for dysuria, frequency and hematuria  Neurological: Negative for dizziness, syncope, weakness, light-headedness, numbness and headaches        Past Medical History:     Past Medical History:   Diagnosis Date    Asthma     [address]'s [address] [address]     Dermatitis     Dry eye syndrome of right eye       Past Surgical History:     Past Surgical History:   Procedure Laterality Date    NO PAST SURGERIES        Social History:     Social History     Socioeconomic History    Marital status: Single     Spouse name: Not on file    Number of children: Not on file    Years of education: Not on file    Highest education level: Not on file   Occupational History    Not on file   Social Needs    Financial resource strain: Not on file    Food insecurity: Worry: Not on file     Inability: Not on file    Transportation needs:     Medical: Not on file     Non-medical: Not on file   Tobacco Use    Smoking status: Never Smoker    Smokeless tobacco: Never Used   Substance and Sexual Activity    Alcohol use: No    Drug use: Never    Sexual activity: Not on file   Lifestyle    Physical activity:     Days per week: Not on file     Minutes per session: Not on file    Stress: Not on file   Relationships    Social connections:     Talks on phone: Not on file     Gets together: Not on file     Attends [employer] service: Not on file     Active member of club or organization: Not on file     Attends meetings of clubs or organizations: Not on file     Relationship status: Not on file    Intimate partner violence:     Fear of current or ex partner: Not on file     Emotionally abused: Not on file     Physically abused: Not on file     Forced sexual activity: Not on file   Other Topics Concern    Not on file   Social History Narrative    Not on file      Family History:     Family History   Problem Relation Age of Onset    Crohn's disease Mother     Other Father         spinal stenosis      Current Medications:     Current Outpatient Medications   Medication Sig Dispense Refill    InFLIXimab (REMICADE IV) Infuse into a venous catheter      Polysaccharide Iron Complex (FERREX 150 PO) Take 150 mg by mouth daily      Prenatal Vit-Fe Fumarate-FA (PRENATAL VITAMIN) 27-0 8 MG TABS Take 1 tablet by mouth daily       No current facility-administered medications for this visit  Allergies: Allergies   Allergen Reactions    Sulfa Antibiotics Rash    Sulfasalazine Rash      Physical Exam:     /70   Pulse 78   Temp 99 °F (37 2 °C)   Resp 16   Ht 5' 1 6" (1 565 m)   Wt 61 8 kg (136 lb 3 2 oz)   LMP  (LMP Unknown)   BMI 25 24 kg/m²     Physical Exam   Constitutional: She is oriented to person, place, and time   She appears well-developed and well-nourished  No distress  HENT:   Head: Normocephalic and atraumatic  Right Ear: External ear normal    Left Ear: External ear normal    Nose: Nose normal    Mouth/Throat: Oropharynx is clear and moist  No oropharyngeal exudate  Eyes: Pupils are equal, round, and reactive to light  Conjunctivae are normal  Right eye exhibits no discharge  Left eye exhibits no discharge  No scleral icterus  Neck: Normal range of motion  Neck supple  No JVD present  No tracheal deviation present  No thyromegaly present  Cardiovascular: Normal rate, regular rhythm, normal heart sounds and intact distal pulses  Exam reveals no gallop and no friction rub  No murmur heard  Pulmonary/Chest: Effort normal and breath sounds normal  No respiratory distress  She has no wheezes  She has no rales  She exhibits no tenderness  Abdominal: Soft  Bowel sounds are normal  She exhibits no distension  There is no tenderness  There is no rebound and no guarding  Gravid   Musculoskeletal: Normal range of motion  She exhibits no edema  Neurological: She is alert and oriented to person, place, and time  No cranial nerve deficit  Coordination normal    Skin: Skin is warm and dry  She is not diaphoretic  Psychiatric: She has a normal mood and affect  Her behavior is normal  Thought content normal    Vitals reviewed        Esteban Radford, DO   [facility]

## 2019-09-16 NOTE — PATIENT INSTRUCTIONS

## 2019-09-16 NOTE — ASSESSMENT & PLAN NOTE
Stable  Follows with Dr Rebekah Diggs with [facility]  Currently getting Remicade infusions every 8 weeks

## 2019-09-28 ENCOUNTER — HOSPITAL ENCOUNTER (OUTPATIENT)
Dept: INFUSION CENTER | Facility: HOSPITAL | Age: 24
Discharge: HOME | End: 2019-09-28
Payer: COMMERCIAL

## 2019-09-28 VITALS
RESPIRATION RATE: 20 BRPM | SYSTOLIC BLOOD PRESSURE: 118 MMHG | TEMPERATURE: 97 F | WEIGHT: 137.57 LBS | BODY MASS INDEX: 25.49 KG/M2 | HEART RATE: 84 BPM | DIASTOLIC BLOOD PRESSURE: 66 MMHG

## 2019-09-28 PROCEDURE — 96415 CHEMO IV INFUSION ADDL HR: CPT

## 2019-09-28 PROCEDURE — 96413 CHEMO IV INFUSION 1 HR: CPT

## 2019-09-28 RX ORDER — SODIUM CHLORIDE 9 MG/ML
20 INJECTION, SOLUTION INTRAVENOUS ONCE
Status: COMPLETED | OUTPATIENT
Start: 2019-09-28 | End: 2019-09-28

## 2019-09-28 RX ADMIN — INFLIXIMAB 312 MG: 100 INJECTION, POWDER, LYOPHILIZED, FOR SOLUTION INTRAVENOUS at 10:37

## 2019-09-28 RX ADMIN — SODIUM CHLORIDE 20 ML/HR: 0.9 INJECTION, SOLUTION INTRAVENOUS at 10:20

## 2019-12-13 RX ORDER — SODIUM CHLORIDE 9 MG/ML
20 INJECTION, SOLUTION INTRAVENOUS ONCE
Status: COMPLETED | OUTPATIENT
Start: 2019-12-17 | End: 2019-12-17

## 2019-12-17 ENCOUNTER — HOSPITAL ENCOUNTER (OUTPATIENT)
Dept: INFUSION CENTER | Facility: HOSPITAL | Age: 24
Discharge: HOME | End: 2019-12-17
Payer: COMMERCIAL

## 2019-12-17 VITALS
WEIGHT: 126.54 LBS | HEART RATE: 80 BPM | DIASTOLIC BLOOD PRESSURE: 70 MMHG | RESPIRATION RATE: 16 BRPM | BODY MASS INDEX: 23.45 KG/M2 | SYSTOLIC BLOOD PRESSURE: 116 MMHG | TEMPERATURE: 97.6 F

## 2019-12-17 PROCEDURE — 96413 CHEMO IV INFUSION 1 HR: CPT

## 2019-12-17 PROCEDURE — 96415 CHEMO IV INFUSION ADDL HR: CPT

## 2019-12-17 RX ORDER — SODIUM CHLORIDE 9 MG/ML
20 INJECTION, SOLUTION INTRAVENOUS ONCE
Status: DISCONTINUED | OUTPATIENT
Start: 2019-12-17 | End: 2019-12-17

## 2019-12-17 RX ADMIN — SODIUM CHLORIDE 20 ML/HR: 0.9 INJECTION, SOLUTION INTRAVENOUS at 08:51

## 2019-12-17 RX ADMIN — INFLIXIMAB 300 MG: 100 INJECTION, POWDER, LYOPHILIZED, FOR SOLUTION INTRAVENOUS at 09:27

## 2019-12-17 NOTE — PLAN OF CARE
Problem: Potential for Falls  Goal: Patient will remain free of falls  Description  INTERVENTIONS:  - Assess patient frequently for physical needs  -  Identify cognitive and physical deficits and behaviors that affect risk of falls    -  [place] fall precautions as indicated by assessment   - Educate patient/family on patient safety including physical limitations  - Instruct patient to call for assistance with activity based on assessment  - Modify environment to reduce risk of injury  - Consider OT/PT consult to assist with strengthening/mobility  Outcome: Progressing

## 2024-07-19 ENCOUNTER — TELEPHONE (OUTPATIENT)
Age: 29
End: 2024-07-19

## 2024-07-19 NOTE — TELEPHONE ENCOUNTER
Patients GI provider: RENETTA Anaya    Number to return call: [phone]    Reason for call: Pt called in to schedule appt. Patient stated they have [employer] for insurance, Member ID [id number]. Insurance is coming back as rejected. Please reach out to patient, thank you.    Scheduled procedure/appointment date if applicable: Apt/procedure 8/8/24

## 2024-07-23 ENCOUNTER — TELEPHONE (OUTPATIENT)
Dept: GASTROENTEROLOGY | Facility: MEDICAL CENTER | Age: 29
End: 2024-07-23

## 2024-07-23 NOTE — TELEPHONE ENCOUNTER
Earlier appt with Dr. Jaiyeola offered in an urgent slot; currently have 7/30 at 3:20pm available.  Requested call back from patient to confirm if that would work.

## 2024-08-08 ENCOUNTER — OFFICE VISIT (OUTPATIENT)
Dept: GASTROENTEROLOGY | Facility: MEDICAL CENTER | Age: 29
End: 2024-08-08
Payer: COMMERCIAL

## 2024-08-08 ENCOUNTER — PATIENT MESSAGE (OUTPATIENT)
Dept: GASTROENTEROLOGY | Facility: MEDICAL CENTER | Age: 29
End: 2024-08-08

## 2024-08-08 VITALS
WEIGHT: 142.8 LBS | HEART RATE: 83 BPM | HEIGHT: 60 IN | SYSTOLIC BLOOD PRESSURE: 128 MMHG | OXYGEN SATURATION: 98 % | BODY MASS INDEX: 28.03 KG/M2 | DIASTOLIC BLOOD PRESSURE: 74 MMHG | TEMPERATURE: 97.8 F

## 2024-08-08 DIAGNOSIS — K50.00 CROHN'S DISEASE OF SMALL INTESTINE WITHOUT COMPLICATION (HCC): Primary | ICD-10-CM

## 2024-08-08 PROCEDURE — 99205 OFFICE O/P NEW HI 60 MIN: CPT | Performed by: PHYSICIAN ASSISTANT

## 2024-08-08 NOTE — PROGRESS NOTES
[facility] - Outpatient Consultation  Jodi Pickard 28 y.o. female MRN: [id number]  Encounter: [id number]      Assessment and Plan    1. Crohn's disease  The patient was initially diagnosed in September 2012 with Crohn's disease after presenting with severe abdominal pain, nausea, vomiting, weight loss, and intermittent bloody diarrhea.  She was initially under the care of Dr. Dunaway and more recently BRENT.  The patient was on Remicade from the time of her diagnosis until this July when insurance denied the medication and she was started on Avsola.  Her first dose was 7/27 and she is due again 9/7.  The patient states that she has thus far been doing well on the medication however she is interested in switching back to Remicade.  At this time she denies any abdominal pain, bloody diarrhea, or other GI symptoms or complaints.   -Continue Avsola but will discuss the possibility of going back on Remicade with Dr. Jaiyeola, the patient is aware that we typically do not switch back to the prior medication once stopped    Vaccines: non live flu, covid, pneumococcal, hep A, hep B  Cancer screening: yearly pap smear, yearly derm, colonoscopy 12/2025  Labs: Up-to-date on quant gold, obtain CBC, CMP, hepatitis labs, iron panel, vitamin B12, Vit D    Follow-up 6 months with Dr. Jaiyeola or sooner if needed    ______________________________________________________________________    History of Present Illness  Jodi Pickard is a 28 y.o. female here for consultation of Crohn's disease.  The patient was initially under the care of Dr. Dunaway and then YVES ATWOOD.  The patient states that at this time she is on Avsola and has received her first dose in July.  Her second dose is pending for September.  She denies any significant abdominal pain, nausea, vomiting, weight loss, or diarrhea.  Her IBD history is summarized as below.    IBD type  Crohn's disease    Diagnosis date  2012 after presenting  with severe stomach pain, weight loss, nausea, vomiting, bloody diarrhea. Colonoscopy 9/2012 revealed severe Crohn's disease in the TI, IC valve, and entire colon.  Mucosa appeared edematous, cobblestoned, and ulcerated.  Biopsies taken with acute on chronic colitis with cryptitis, crypt abscess, ulceration, and glandular disarray consistent with moderate to severe active Crohn's disease.    Deficiency severity/behavior  No strictures, fistulas, small bowel obstructions, etc.    Disease complications  None    Extraintestinal manifestations  None    Past surgeries  None     Recent hospitalizations  None     Most recent endoscopy/biopsy  Colonoscopy 12/18/23  Normal aside for aphthous ulcers less than 0.5 cm in size, less than 10% ulcerated surfaces and less than 50% of surfaces affected, score 3 in the right colon  A. Ileum, biopsy:    Small bowel mucosa without significant pathologic changes.    No active inflammation or granulomas seen.    Negative for dysplasia.   B. Cecum, biopsy:    Chronic mildly active colitis with lymphoid aggregates.    Negative for dysplasia.   C. Colon, ascending, biopsy:    Active chronic colitis with lymphoid aggregates.    Negative for dysplasia.    D. Colon, transverse, biopsy:    Chronic, focal mildly active colitis with lymphoid aggregates.    Negative for dysplasia.   E. Colon, descending, biopsy:    Chronic, focal minimal active colitis with lymphoid aggregates.    Negative for dysplasia.   F. Colon, sigmoid, biopsy:    Colonic mucosa with chronic inflammation and lymphoid aggregates.    Negative for dysplasia.   G. Rectum, biopsy:    Very focal active cryptitis associated with lymphoid aggregates.   No obvious chronicity seen.    Negative for dysplasia.     Most recent imaging  MRE 2/16/24  1.  No acute intra-abdominal process seen.   2.  2.4 cm simple appearing right ovarian cyst.     IBD related medications  No recent steroids   Remicade 5mg/kg 8/2012-1/2021  Increased to  Remicade 10mg every 6 weeks 1/28/21-7/2024 (was off this in 2019 and again 2022 during pregnancy)  July 2024 insurance denied the medication and she was started on Avsola with her first dose 7/27/24, next dose 9/7/24    Labs  Remicade 12/2023 drug level 3.22 antibody level none detected   Quant Gold 5/13/24 negative      Review of Systems   HENT:  Negative for mouth sores and sore throat.    Respiratory:  Negative for shortness of breath.    Cardiovascular:  Negative for chest pain.   Musculoskeletal:  Negative for arthralgias.   Skin:  Negative for rash.       Past Medical History  Past Medical History:   Diagnosis Date    Asthma     Crohn's disease (HCC)     Dermatitis     Dry eye syndrome of right eye        Past Social history  Past Surgical History:   Procedure Laterality Date    NO PAST SURGERIES       Social History     Socioeconomic History    Marital status: Single     Spouse name: Not on file    Number of children: Not on file    Years of education: Not on file    Highest education level: Not on file   Occupational History    Not on file   Tobacco Use    Smoking status: Never    Smokeless tobacco: Never   Substance and Sexual Activity    Alcohol use: No    Drug use: Never    Sexual activity: Not on file   Other Topics Concern    Not on file   Social History Narrative    Not on file     Social Determinants of Health     Financial Resource Strain: Low Risk  (6/5/2023)    [facility]    Overall Financial Resource Strain (CARDIA)     Difficulty of Paying Living Expenses: Not hard at all   Food Insecurity: No Food Insecurity (6/5/2023)    [facility]    Hunger Vital Sign     Worried About Running Out of Food in the Last Year: Never true     Ran Out of Food in the Last Year: Never true   Transportation Needs: No Transportation Needs (6/5/2023)    Received from [facility],  [facility]    PRAPARE - Transportation     Lack of Transportation (Medical): No     Lack of Transportation (Non-Medical): No   Physical Activity: Not on file   Stress: No Stress Concern Present (6/5/2023)    [facility] from [facility]    [employer] - Occupational Stress Questionnaire     Feeling of Stress : Not at all   Social Connections: Unknown (6/18/2024)    Received from [facility]    Social Connections     How often do you feel lonely or isolated from those around you? (Adult - for ages 18 years and over): Not on file   Intimate Partner Violence: Not At Risk (6/5/2023)    [facility] from [facility]    Humiliation, Afraid, Rape, and Kick questionnaire     Fear of Current or Ex-Partner: No     Emotionally Abused: No     Physically Abused: No     Sexually Abused: No   Housing Stability: Unknown (6/5/2023)    [facility] from [facility], [facility]    Housing Stability Vital Sign     Unable to Pay for Housing in the Last Year: No     Number of Places Lived in the Last Year: Not on file     Unstable Housing in the Last Year: No     Social History     Substance and Sexual Activity   Alcohol Use No     Social History     Substance and Sexual Activity   Drug Use Never     Social History     Tobacco Use   Smoking Status Never   Smokeless Tobacco Never       Past Family History  Family History   Problem Relation Age of Onset    Crohn's disease Mother     Other Father         spinal stenosis       Current Medications  Current Outpatient Medications   Medication Sig Dispense Refill    InFLIXimab (REMICADE IV) Infuse into a venous catheter      Polysaccharide Iron Complex (FERREX 150 PO) Take 150 mg by mouth daily      Prenatal Vit-Fe Fumarate-FA (PRENATAL VITAMIN) 27-0.8 MG TABS Take 1 tablet by mouth daily       No current facility-administered  medications for this visit.       Allergies  Allergies   Allergen Reactions    Sulfa Antibiotics Rash    Sulfasalazine Rash         The following portions of the patient's history were reviewed and updated as appropriate: allergies, current medications, past medical history, past social history, past surgical history and problem list.      Vitals  There were no vitals filed for this visit.      Physical Exam  Constitutional   General appearance: Patient is seated and in no acute distress, well appearing and well nourished.   Head and Face   Head and face: Normal.    Eyes   Conjunctiva and lids: No erythema, swelling or discharge.  Anicteric.  Ears, Nose, Mouth, and Throat   Hearing: Normal.    Neck: Supple, trachea midline.  Pulmonary   Respiratory effort: No increased work of breathing or signs of respiratory distress.    Cardiovascular   Examination of extremities for edema and/or varicosities: Normal.    Musculoskeletal   Gait and station: Normal    Skin   Skin and subcutaneous tissue: Warm, dry, and intact. No visible jaundice, lesions or rashes.  Psychiatric   Judgment and insight: Normal  Recent and remote memory:  Normal  Mood and affect: Normal      Results  No visits with results within 1 Day(s) from this visit.   Latest known visit with results is:   Lab Requisition on 04/13/2018   Component Date Value    CRP 04/13/2018 11.9 (H)        Radiology Results  No results found.    Orders  No orders of the defined types were placed in this encounter.      Answers submitted by the patient for this visit:  Inflammatory Bowel Disease (Submitted on 8/1/2024)  When you are not experiencing symptoms of your inflammatory bowel disease, how many bowel movements do you typically have each day?: 2  Over the last 3 days, what is the maximum number of bowel movements that you had in a single day?: 2  Over the last 3 days, have you had any bowel movements where you passed blood without stool?: No  Since your last visit, have  you received any vaccinations?: No  Since the last visit, have you had an infection?: No  Is there any possibility that you may be pregnant?: No  In the past three months, have you used tobacco in any form?: No  During the last year, how many days have you missed work or school because of your inflammatory bowel disease?: 0  During the last year, how many days have you been hospitalized because of your inflammatory bowel disease?: 0  During the last year, how many days have you visited a hospital emergency department because of your inflammatory bowel disease?: 0  During the last month, have you taken narcotic pain medications (such as Percocet, oxycodone, Oxycontin, morphine, Vicodin, Dilaudid, MS Contin) for your inflammatory bowel disease?: No  During the last month, have you awoken at night to move your bowels?: No  During the last month, have you had leakage of your stool while sleeping?: No  During the last month, have you had incontinence of stool while you were awake?: No  During the last month have you unintentionally lost weight?: No  During the last 3 days, have you had a fever?: No  During the last 3 days, have you had eye irritation?: No  During the last 3 days, have you had numbness or tingling in your hands or feet?: No  During the last 3 days, have you had bruising or bleeding?: No  During the last 3 days, have you felt depressed or blue?: No

## 2024-08-08 NOTE — Clinical Note
Hi!  This patient's mother is a patient of yours and it was requested that this patient see you but unfortunately she was only able to get in with me.  She has chron's disease and would like to transition from [facility] to us.  She was well-maintained on Remicade but due to insurance issues she was switched to Avsola and received her first dose in July.  The patient would like to switch back to Remicade.  I told her we do not typically do this but that I would have you review her chart and let us know if you have any further input.  If not, she is scheduled to see you in 6 months.

## 2024-08-13 DIAGNOSIS — K50.00 CROHN'S DISEASE OF SMALL INTESTINE WITHOUT COMPLICATION (HCC): Primary | ICD-10-CM

## 2024-08-14 ENCOUNTER — TELEPHONE (OUTPATIENT)
Dept: GASTROENTEROLOGY | Facility: MEDICAL CENTER | Age: 29
End: 2024-08-14

## 2024-08-14 NOTE — TELEPHONE ENCOUNTER
Scheduled date of Colonoscopy & EGD (as of today) December 4  Physician performing: Dr. Jaiyeola  Location of procedure:    Instructions given to patient: Meenakshi  Clearances: N/A  Diabetic: N/A      Mailed instructions to patient's home address per request

## 2024-08-14 NOTE — TELEPHONE ENCOUNTER
----- Message from Umer HARO sent at 8/13/2024  9:37 AM EDT -----  Regarding: FW:   Can she be rescheduled for EGD colon orders  ----- Message -----  From: Diana M Jaiyeola, MD  Sent: 8/13/2024   9:22 AM EDT  To: Starla Anaya PA-C; #  Subject: RE:                                              Dario Osorio,   The other thing I would recommend is to check another infliximab level and antibody prior to her next infusion.  From her labs December 2023 she had subtherapeutic infliximab levels and no antibodies.  Therapeutic level of infliximab should be around 5 for maintenance dosing.  Her last colonoscopy path also showed active colitis.  If her infliximab levels are low then she may need to increase the frequency of her infusions.  She will also be due for surveillance colonoscopy in November/December 2024 which could be scheduled with me and then a follow-up visit in February which is currently scheduled.    [place] clinical team: Can you please contact the patient to let her know I have ordered a infliximab level and antibody test for her?  She should have this done the morning of her next infusion, just prior to the infusion.  I have placed the lab order In her chart.     She should also be scheduled for EGD and colonoscopy with me approximately November/December of this year for surveillance given history of Crohn's disease.  I have placed the EGD and colonoscopy order as well.    Thanks,  Dr. Jaiyeola  ----- Message -----  From: Starla Anaya PA-C  Sent: 8/13/2024   8:30 AM EDT  To: Diana M Jaiyeola, MD  Subject: RE:                                              Thanks I wish I had realized they were biosimilars when I saw her but I sent her a mychart message to give her a little more detail and then hopefully they can get her scheduled with you next! Appreciate it so much  ----- Message -----  From: Diana M Jaiyeola, MD  Sent: 8/9/2024   5:10 AM EDT  To: Starla Anaya PA-C; #  Subject: RE:                                               Hi Jo    Avsola is a biosimilar to remicade. This means that it is a near identical copy to Remicade. They are both the drug inflximab and have the same active ingredient and mechanism of action. There is no clinically significant difference between the two. I think if she's doing well on Avsola (infliximab) ot should be continued but I will review her chart  [place] office please schedule the patient for a visit with me within 4-6 weeks or place her on a high priority cancellation list.     Thanks,  Dr. Jaiyeola   ----- Message -----  From: Starla Anaya PA-C  Sent: 8/8/2024   2:57 PM EDT  To: Diana M Jaiyeola, MD    Hi!  This patient's mother is a patient of yours and it was requested that this patient see you but unfortunately she was only able to get in with me.  She has chron's disease and would like to transition from [facility] to us.  She was well-maintained on Remicade but due to insurance issues she was switched to Avsola and received her first dose in July.  The patient would like to switch back to Remicade.  I told her we do not typically do this but that I would have you review her chart and let us know if you have any further input.  If not, she is scheduled to see you in 6 months.

## 2024-08-28 ENCOUNTER — TELEPHONE (OUTPATIENT)
Dept: GASTROENTEROLOGY | Facility: CLINIC | Age: 29
End: 2024-08-28

## 2024-08-28 NOTE — TELEPHONE ENCOUNTER
8/28 pt transferred care to [facility]. She is currently getting her infusions with [facility], and has one scheduled for September under that approval. After that we will need to obtain a new auth for pt.

## 2024-09-04 ENCOUNTER — OFFICE VISIT (OUTPATIENT)
Dept: URGENT CARE | Age: 29
End: 2024-09-04
Payer: COMMERCIAL

## 2024-09-04 VITALS
HEART RATE: 121 BPM | TEMPERATURE: 98.6 F | RESPIRATION RATE: 20 BRPM | SYSTOLIC BLOOD PRESSURE: 133 MMHG | OXYGEN SATURATION: 98 % | DIASTOLIC BLOOD PRESSURE: 89 MMHG | WEIGHT: 142 LBS | BODY MASS INDEX: 27.73 KG/M2

## 2024-09-04 DIAGNOSIS — J02.9 SORE THROAT: ICD-10-CM

## 2024-09-04 DIAGNOSIS — J02.9 ACUTE PHARYNGITIS, UNSPECIFIED ETIOLOGY: ICD-10-CM

## 2024-09-04 LAB — S PYO AG THROAT QL: NEGATIVE

## 2024-09-04 PROCEDURE — 99213 OFFICE O/P EST LOW 20 MIN: CPT | Performed by: FAMILY MEDICINE

## 2024-09-04 PROCEDURE — 87880 STREP A ASSAY W/OPTIC: CPT | Performed by: PHYSICIAN ASSISTANT

## 2024-09-04 PROCEDURE — S9088 SERVICES PROVIDED IN URGENT: HCPCS | Performed by: FAMILY MEDICINE

## 2024-09-04 PROCEDURE — 87070 CULTURE OTHR SPECIMN AEROBIC: CPT | Performed by: PHYSICIAN ASSISTANT

## 2024-09-04 RX ORDER — CLINDAMYCIN HCL 300 MG
300 CAPSULE ORAL 4 TIMES DAILY
Qty: 40 CAPSULE | Refills: 0 | Status: SHIPPED | OUTPATIENT
Start: 2024-09-04 | End: 2024-09-14

## 2024-09-04 RX ORDER — NORETHINDRONE ACETATE AND ETHINYL ESTRADIOL AND FERROUS FUMARATE 1MG-20(21)
KIT ORAL
COMMUNITY
Start: 2024-06-27

## 2024-09-04 RX ORDER — PREDNISONE 10 MG/1
TABLET ORAL
Qty: 20 TABLET | Refills: 0 | Status: SHIPPED | OUTPATIENT
Start: 2024-09-04

## 2024-09-04 RX ORDER — LEVOCETIRIZINE DIHYDROCHLORIDE 5 MG/1
5 TABLET, FILM COATED ORAL
COMMUNITY
Start: 2024-06-26 | End: 2025-06-26

## 2024-09-04 RX ORDER — MOMETASONE FUROATE 1 MG/ML
SOLUTION TOPICAL
COMMUNITY
Start: 2024-07-22

## 2024-09-04 NOTE — PROGRESS NOTES
[facility]        NAME: Jodi Pickard is a 28 y.o. female  : 1995    MRN: [id number]  DATE: 2024  TIME: 6:39 PM    /89   Pulse (!) 121   Temp 98.6 °F (37 °C) (Tympanic)   Resp 20   Wt 64.4 kg (142 lb)   SpO2 98%   BMI 27.73 kg/m²     Assessment and Plan   No primary diagnosis found.  1. Acute pharyngitis, unspecified etiology  POCT rapid ANTIGEN strepA    Throat culture    Throat culture    clindamycin (CLEOCIN) 300 MG capsule    predniSONE 10 mg tablet      2. Sore throat  clindamycin (CLEOCIN) 300 MG capsule    predniSONE 10 mg tablet    CANCELED: Infliximab Concentration and Anti-Infliximab Antibody            Patient Instructions       Follow up with PCP in 3-5 days.  Proceed to  ER if symptoms worsen.    Chief Complaint     Chief Complaint   Patient presents with    Cold Like Symptoms     Pt started one week ago with bilateral ear fullness/pain and sore throat.  Taking Tylenol for sx relief. Took several at home Covid tests, last on Thursday (neg)         History of Present Illness       Pt with sore throat and minor cough for about 7-8 days     Sore Throat   This is a new problem. The current episode started in the past 7 days. The problem has been unchanged. The pain is worse on the left side. There has been no fever. The pain is at a severity of 6/10. The pain is moderate. Associated symptoms include congestion, coughing, ear pain, headaches, a hoarse voice, a plugged ear sensation, swollen glands and trouble swallowing. Pertinent negatives include no abdominal pain, diarrhea, drooling, ear discharge, shortness of breath or stridor.       Review of Systems   Review of Systems   Constitutional: Negative.    HENT:  Positive for congestion, ear pain, hoarse voice, sore throat and trouble swallowing. Negative for drooling and ear discharge.    Eyes: Negative.    Respiratory:  Positive for cough. Negative for shortness of breath and stridor.    Gastrointestinal:   Negative for abdominal pain and diarrhea.   Endocrine: Negative.    Genitourinary: Negative.    Musculoskeletal: Negative.    Skin: Negative.    Allergic/Immunologic: Negative.    Neurological:  Positive for headaches.   Hematological: Negative.    Psychiatric/Behavioral: Negative.     All other systems reviewed and are negative.        Current Medications       Current Outpatient Medications:     clindamycin (CLEOCIN) 300 MG capsule, Take 1 capsule (300 mg total) by mouth 4 (four) times a day for 10 days, Disp: 40 capsule, Rfl: 0    InFLIXimab (REMICADE IV), Infuse into a venous catheter, Disp: , Rfl:     Junel FE 1/20 1-20 MG-MCG per tablet, , Disp: , Rfl:     levocetirizine (XYZAL) 5 MG tablet, Take 5 mg by mouth, Disp: , Rfl:     mometasone (ELOCON) 0.1 % lotion, apply TO scalp once daily if needed, Disp: , Rfl:     Polysaccharide Iron Complex (FERREX 150 PO), Take 150 mg by mouth daily, Disp: , Rfl:     predniSONE 10 mg tablet, 4 tabs po qd x 2 days then 3 tabs po qd x 2 days then 2 tabs po qd x 2 days then 1 tab po qd x 2 days, Disp: 20 tablet, Rfl: 0    Prenatal Vit-Fe Fumarate-FA (PRENATAL VITAMIN) 27-0.8 MG TABS, Take 1 tablet by mouth daily (Patient not taking: Reported on 9/4/2024), Disp: , Rfl:     Current Allergies     Allergies as of 09/04/2024 - Reviewed 09/04/2024   Allergen Reaction Noted    Sulfa antibiotics Rash 02/13/2016    Sulfasalazine Rash 02/13/2016            The following portions of the patient's history were reviewed and updated as appropriate: allergies, current medications, past family history, past medical history, past social history, past surgical history and problem list.     Past Medical History:   Diagnosis Date    Asthma     Crohn's disease (HCC)     Dermatitis     Dry eye syndrome of right eye        Past Surgical History:   Procedure Laterality Date    NO PAST SURGERIES         Family History   Problem Relation Age of Onset    Crohn's disease Mother     Other Father          spinal stenosis         Medications have been verified.        Objective   /89   Pulse (!) 121   Temp 98.6 °F (37 °C) (Tympanic)   Resp 20   Wt 64.4 kg (142 lb)   SpO2 98%   BMI 27.73 kg/m²        Physical Exam     Physical Exam  Vitals and nursing note reviewed.   Constitutional:       Appearance: Normal appearance. She is normal weight.   HENT:      Head: Normocephalic and atraumatic.      Right Ear: Tympanic membrane, ear canal and external ear normal.      Left Ear: Tympanic membrane, ear canal and external ear normal.      Nose: Nose normal.      Mouth/Throat:      Mouth: Mucous membranes are moist.      Pharynx: Oropharyngeal exudate and posterior oropharyngeal erythema present.   Cardiovascular:      Rate and Rhythm: Normal rate and regular rhythm.      Pulses: Normal pulses.      Heart sounds: Normal heart sounds.   Pulmonary:      Effort: Pulmonary effort is normal.      Breath sounds: Normal breath sounds.   Abdominal:      General: Abdomen is flat. Bowel sounds are normal.      Palpations: Abdomen is soft.   Musculoskeletal:         General: Normal range of motion.      Cervical back: Normal range of motion and neck supple.   Lymphadenopathy:      Cervical: Cervical adenopathy present.   Skin:     General: Skin is warm.      Capillary Refill: Capillary refill takes less than 2 seconds.   Neurological:      Mental Status: She is alert and oriented to person, place, and time.

## 2024-09-05 ENCOUNTER — PATIENT MESSAGE (OUTPATIENT)
Dept: GASTROENTEROLOGY | Facility: MEDICAL CENTER | Age: 29
End: 2024-09-05

## 2024-09-06 LAB — BACTERIA THROAT CULT: NORMAL

## 2024-09-07 ENCOUNTER — APPOINTMENT (OUTPATIENT)
Dept: LAB | Facility: IMAGING CENTER | Age: 29
End: 2024-09-07
Payer: COMMERCIAL

## 2024-09-07 DIAGNOSIS — K50.00 CROHN'S DISEASE OF SMALL INTESTINE WITHOUT COMPLICATION (HCC): ICD-10-CM

## 2024-09-07 LAB
25(OH)D3 SERPL-MCNC: 15.7 NG/ML (ref 30–100)
ALBUMIN SERPL BCG-MCNC: 3.7 G/DL (ref 3.5–5)
ALP SERPL-CCNC: 74 U/L (ref 34–104)
ALT SERPL W P-5'-P-CCNC: 18 U/L (ref 7–52)
ANION GAP SERPL CALCULATED.3IONS-SCNC: 10 MMOL/L (ref 4–13)
AST SERPL W P-5'-P-CCNC: 17 U/L (ref 13–39)
BASOPHILS # BLD MANUAL: 0 THOUSAND/UL (ref 0–0.1)
BASOPHILS NFR MAR MANUAL: 0 % (ref 0–1)
BILIRUB SERPL-MCNC: 0.2 MG/DL (ref 0.2–1)
BUN SERPL-MCNC: 15 MG/DL (ref 5–25)
CALCIUM SERPL-MCNC: 9 MG/DL (ref 8.4–10.2)
CHLORIDE SERPL-SCNC: 101 MMOL/L (ref 96–108)
CO2 SERPL-SCNC: 27 MMOL/L (ref 21–32)
CREAT SERPL-MCNC: 0.71 MG/DL (ref 0.6–1.3)
CRP SERPL QL: 13.5 MG/L
DIFFERENTIAL COMMENT: ABNORMAL
EOSINOPHIL # BLD MANUAL: 0.18 THOUSAND/UL (ref 0–0.4)
EOSINOPHIL NFR BLD MANUAL: 1 % (ref 0–6)
ERYTHROCYTE [DISTWIDTH] IN BLOOD BY AUTOMATED COUNT: 15.3 % (ref 11.6–15.1)
FERRITIN SERPL-MCNC: 16 NG/ML (ref 11–307)
GFR SERPL CREATININE-BSD FRML MDRD: 116 ML/MIN/1.73SQ M
GLUCOSE P FAST SERPL-MCNC: 71 MG/DL (ref 65–99)
HCT VFR BLD AUTO: 37.9 % (ref 34.8–46.1)
HGB BLD-MCNC: 11.6 G/DL (ref 11.5–15.4)
IRON SATN MFR SERPL: 11 % (ref 15–50)
IRON SERPL-MCNC: 43 UG/DL (ref 50–212)
LYMPHOCYTES # BLD AUTO: 32 % (ref 14–44)
LYMPHOCYTES # BLD AUTO: 6.98 THOUSAND/UL (ref 0.6–4.47)
MCH RBC QN AUTO: 27.2 PG (ref 26.8–34.3)
MCHC RBC AUTO-ENTMCNC: 30.6 G/DL (ref 31.4–37.4)
MCV RBC AUTO: 89 FL (ref 82–98)
MONOCYTES # BLD AUTO: 1.47 THOUSAND/UL (ref 0–1.22)
MONOCYTES NFR BLD: 8 % (ref 4–12)
NEUTROPHILS # BLD MANUAL: 9.73 THOUSAND/UL (ref 1.85–7.62)
NEUTS SEG NFR BLD AUTO: 53 % (ref 43–75)
PLATELET # BLD AUTO: 405 THOUSANDS/UL (ref 149–390)
PLATELET BLD QL SMEAR: ABNORMAL
PMV BLD AUTO: 9.2 FL (ref 8.9–12.7)
POTASSIUM SERPL-SCNC: 4.3 MMOL/L (ref 3.5–5.3)
PROT SERPL-MCNC: 7.9 G/DL (ref 6.4–8.4)
RBC # BLD AUTO: 4.26 MILLION/UL (ref 3.81–5.12)
RBC MORPH BLD: NORMAL
SMUDGE CELLS BLD QL SMEAR: PRESENT
SODIUM SERPL-SCNC: 138 MMOL/L (ref 135–147)
TIBC SERPL-MCNC: 389 UG/DL (ref 250–450)
UIBC SERPL-MCNC: 346 UG/DL (ref 155–355)
VARIANT LYMPHS # BLD AUTO: 6 %
VIT B12 SERPL-MCNC: 332 PG/ML (ref 180–914)
WBC # BLD AUTO: 18.36 THOUSAND/UL (ref 4.31–10.16)

## 2024-09-07 PROCEDURE — 86140 C-REACTIVE PROTEIN: CPT

## 2024-09-07 PROCEDURE — 80053 COMPREHEN METABOLIC PANEL: CPT

## 2024-09-07 PROCEDURE — 83540 ASSAY OF IRON: CPT

## 2024-09-07 PROCEDURE — 82306 VITAMIN D 25 HYDROXY: CPT

## 2024-09-07 PROCEDURE — 85007 BL SMEAR W/DIFF WBC COUNT: CPT

## 2024-09-07 PROCEDURE — 87340 HEPATITIS B SURFACE AG IA: CPT

## 2024-09-07 PROCEDURE — 82607 VITAMIN B-12: CPT

## 2024-09-07 PROCEDURE — 82728 ASSAY OF FERRITIN: CPT

## 2024-09-07 PROCEDURE — 86706 HEP B SURFACE ANTIBODY: CPT

## 2024-09-07 PROCEDURE — 86704 HEP B CORE ANTIBODY TOTAL: CPT

## 2024-09-07 PROCEDURE — 85027 COMPLETE CBC AUTOMATED: CPT

## 2024-09-07 PROCEDURE — 83550 IRON BINDING TEST: CPT

## 2024-09-07 PROCEDURE — 36415 COLL VENOUS BLD VENIPUNCTURE: CPT

## 2024-09-08 LAB
HBV CORE AB SER QL: NORMAL
HBV SURFACE AB SER-ACNC: <3 MIU/ML
HBV SURFACE AG SER QL: NORMAL

## 2024-09-10 DIAGNOSIS — K50.00 CROHN'S DISEASE OF SMALL INTESTINE WITHOUT COMPLICATION (HCC): Primary | ICD-10-CM

## 2024-09-11 NOTE — PATIENT COMMUNICATION
I spoke with the patient. Pt to reach out to insurance to inquire if they prefer a specific lab service or location. Provided  through [employer] to check price if pt completes infliximab level and antibody tests through [employer]. Pts next infliximab infusion 10/16 and aware to draw infliximab lab 1 day prior if price is within pts price range.

## 2024-10-22 ENCOUNTER — TELEPHONE (OUTPATIENT)
Age: 29
End: 2024-10-22

## 2024-10-22 NOTE — TELEPHONE ENCOUNTER
Patients GI provider:  Dr. Jaiyeola    Number to return call: [phone]    Reason for call: Pt calling stating she has switched from [facility] home infusion to [facility] home infusion.  She is scheduled for 12/2/24  She states she needs a new PA for     InFLIXimab (REMICADE IV)       Scheduled procedure/appointment date if applicable: 12/4/24

## 2024-11-07 ENCOUNTER — TELEPHONE (OUTPATIENT)
Dept: GASTROENTEROLOGY | Facility: CLINIC | Age: 29
End: 2024-11-07

## 2024-11-07 NOTE — TELEPHONE ENCOUNTER
Received [facility] "[employer] form.  Sent to task AP for signature.

## 2024-11-20 ENCOUNTER — TELEPHONE (OUTPATIENT)
Dept: GASTROENTEROLOGY | Facility: MEDICAL CENTER | Age: 29
End: 2024-11-20

## 2024-11-20 NOTE — TELEPHONE ENCOUNTER
Called and spoke to patient to confirm procedure for 12/04/2024 with Dr. Jaiyeola at [place], Patient has confirmed and asked to send prep instructions via [employer]

## 2024-12-04 ENCOUNTER — ANESTHESIA (OUTPATIENT)
Dept: GASTROENTEROLOGY | Facility: HOSPITAL | Age: 29
End: 2024-12-04
Payer: COMMERCIAL

## 2024-12-04 ENCOUNTER — HOSPITAL ENCOUNTER (OUTPATIENT)
Dept: GASTROENTEROLOGY | Facility: HOSPITAL | Age: 29
Setting detail: OUTPATIENT SURGERY
Discharge: HOME | End: 2024-12-04
Attending: INTERNAL MEDICINE
Payer: COMMERCIAL

## 2024-12-04 ENCOUNTER — ANESTHESIA EVENT (OUTPATIENT)
Dept: GASTROENTEROLOGY | Facility: HOSPITAL | Age: 29
End: 2024-12-04
Payer: COMMERCIAL

## 2024-12-04 VITALS
BODY MASS INDEX: 27.88 KG/M2 | OXYGEN SATURATION: 100 % | TEMPERATURE: 97.2 F | WEIGHT: 142 LBS | SYSTOLIC BLOOD PRESSURE: 114 MMHG | RESPIRATION RATE: 20 BRPM | DIASTOLIC BLOOD PRESSURE: 64 MMHG | HEIGHT: 60 IN | HEART RATE: 92 BPM

## 2024-12-04 DIAGNOSIS — K50.10 CROHN'S DISEASE OF COLON WITHOUT COMPLICATION (HCC): Primary | ICD-10-CM

## 2024-12-04 DIAGNOSIS — K50.00 CROHN'S DISEASE OF SMALL INTESTINE WITHOUT COMPLICATION (HCC): ICD-10-CM

## 2024-12-04 LAB
EXT PREGNANCY TEST URINE: NEGATIVE
EXT. CONTROL: NORMAL

## 2024-12-04 PROCEDURE — 88342 IMHCHEM/IMCYTCHM 1ST ANTB: CPT | Performed by: PATHOLOGY

## 2024-12-04 PROCEDURE — 43239 EGD BIOPSY SINGLE/MULTIPLE: CPT | Performed by: INTERNAL MEDICINE

## 2024-12-04 PROCEDURE — 81025 URINE PREGNANCY TEST: CPT | Performed by: ANESTHESIOLOGY

## 2024-12-04 PROCEDURE — 88305 TISSUE EXAM BY PATHOLOGIST: CPT | Performed by: PATHOLOGY

## 2024-12-04 PROCEDURE — 45380 COLONOSCOPY AND BIOPSY: CPT | Performed by: INTERNAL MEDICINE

## 2024-12-04 RX ORDER — LIDOCAINE HYDROCHLORIDE 20 MG/ML
INJECTION, SOLUTION EPIDURAL; INFILTRATION; INTRACAUDAL; PERINEURAL AS NEEDED
Status: DISCONTINUED | OUTPATIENT
Start: 2024-12-04 | End: 2024-12-04

## 2024-12-04 RX ORDER — PROPOFOL 10 MG/ML
INJECTION, EMULSION INTRAVENOUS CONTINUOUS PRN
Status: DISCONTINUED | OUTPATIENT
Start: 2024-12-04 | End: 2024-12-04

## 2024-12-04 RX ORDER — PROPOFOL 10 MG/ML
INJECTION, EMULSION INTRAVENOUS AS NEEDED
Status: DISCONTINUED | OUTPATIENT
Start: 2024-12-04 | End: 2024-12-04

## 2024-12-04 RX ORDER — SODIUM CHLORIDE, SODIUM LACTATE, POTASSIUM CHLORIDE, CALCIUM CHLORIDE 600; 310; 30; 20 MG/100ML; MG/100ML; MG/100ML; MG/100ML
INJECTION, SOLUTION INTRAVENOUS CONTINUOUS PRN
Status: DISCONTINUED | OUTPATIENT
Start: 2024-12-04 | End: 2024-12-04

## 2024-12-04 RX ADMIN — LIDOCAINE HYDROCHLORIDE 80 MG: 20 INJECTION, SOLUTION EPIDURAL; INFILTRATION; INTRACAUDAL; PERINEURAL at 10:49

## 2024-12-04 RX ADMIN — PROPOFOL 150 MG: 10 INJECTION, EMULSION INTRAVENOUS at 10:49

## 2024-12-04 RX ADMIN — SODIUM CHLORIDE, SODIUM LACTATE, POTASSIUM CHLORIDE, AND CALCIUM CHLORIDE: .6; .31; .03; .02 INJECTION, SOLUTION INTRAVENOUS at 09:42

## 2024-12-04 RX ADMIN — PROPOFOL 160 MCG/KG/MIN: 10 INJECTION, EMULSION INTRAVENOUS at 10:50

## 2024-12-04 NOTE — ANESTHESIA PREPROCEDURE EVALUATION
Procedure:  COLONOSCOPY  EGD    Relevant Problems   GYN   (+) 29 weeks gestation of pregnancy      PULMONARY   (+) Mild intermittent asthma without complication        Physical Exam    Airway    Mallampati score: II    Neck ROM: full     Dental   No notable dental hx     Cardiovascular  Cardiovascular exam normal    Pulmonary  Pulmonary exam normal     Other Findings  post-pubertal.      Anesthesia Plan  ASA Score- 2     Anesthesia Type- IV sedation with anesthesia with ASA Monitors.         Additional Monitors:     Airway Plan:            Plan Factors-Exercise tolerance (METS): >4 METS.    Chart reviewed.   Existing labs reviewed.     Patient is not a current smoker. Patient not instructed to abstain from smoking on day of procedure. Patient did not smoke on day of surgery.            Induction-     Postoperative Plan-         Informed Consent- Anesthetic plan and risks discussed with patient.  I personally reviewed this patient with the CRNA. Discussed and agreed on the Anesthesia Plan with the CRNA..

## 2024-12-04 NOTE — ANESTHESIA POSTPROCEDURE EVALUATION
Post-Op Assessment Note    CV Status:  Stable  Pain Score: 0    Pain management: adequate       Mental Status:  Sleepy and arousable   PONV Controlled:  None   Airway Patency:  Patent     Post Op Vitals Reviewed: Yes    No anethesia notable event occurred.    Staff: CRNA           Last Filed PACU Vitals:  Vitals Value Taken Time   Temp 97    Pulse 103    /79    Resp 16    SpO2 98        Modified Quentin:  Activity: 2 (12/4/2024  9:36 AM)  Respiration: 2 (12/4/2024  9:36 AM)  Circulation: 2 (12/4/2024  9:36 AM)  Consciousness: 2 (12/4/2024  9:36 AM)  Oxygen Saturation: 2 (12/4/2024  9:36 AM)  Modified Quentin Score: 10 (12/4/2024  9:36 AM)

## 2024-12-04 NOTE — H&P
H&P - Gastroenterology   Name: Jodi Pickard 29 y.o. female I MRN: [id number]  Unit/Bed#:  I Date of Admission: 12/4/2024   Date of Service: 12/4/2024 I Hospital Day: 0     Assessment & Plan   This is a 29 y.o. year old female here for egd/colonoscopy, and she is stable and optimized for her procedure.    History of Present Illness    Jodi Pickard is a 29 y.o. year old female who presents for crohn's disease    REVIEW OF SYSTEMS: Per the HPI, and otherwise unremarkable.    Historical Information   Past Medical History:   Diagnosis Date    Asthma     Crohn's disease (HCC)     Dermatitis     Dry eye syndrome of right eye      Past Surgical History:   Procedure Laterality Date    NO PAST SURGERIES       Social History     Tobacco Use    Smoking status: Never    Smokeless tobacco: Never   Substance and Sexual Activity    Alcohol use: No    Drug use: Never    Sexual activity: Not on file     E-Cigarette/Vaping     E-Cigarette/Vaping Substances     Family history of crohn's disease    Meds/Allergies     Current Outpatient Medications:     Junel FE 1/20 1-20 MG-MCG per tablet    InFLIXimab (REMICADE IV)    levocetirizine (XYZAL) 5 MG tablet    mometasone (ELOCON) 0.1 % lotion    Polysaccharide Iron Complex (FERREX 150 PO)    predniSONE 10 mg tablet    Prenatal Vit-Fe Fumarate-FA (PRENATAL VITAMIN) 27-0.8 MG TABS  Allergies   Allergen Reactions    Sulfa Antibiotics Rash    Sulfasalazine Rash       Objective :  Temp:  [98.1 °F (36.7 °C)] 98.1 °F (36.7 °C)  HR:  [119] 119  BP: (120)/(90) 120/90  Resp:  [20] 20  SpO2:  [100 %] 100 %  O2 Device: None (Room air)    Physical Exam  Gen: NAD  Head: NCAT  CV: RRR  CHEST: Clear  ABD: soft, NT/ND  EXT: no edema  
PAST SURGICAL HISTORY:  H/O hernia repair age 6    No significant past surgical history

## 2024-12-09 ENCOUNTER — RESULTS FOLLOW-UP (OUTPATIENT)
Dept: GASTROENTEROLOGY | Facility: MEDICAL CENTER | Age: 29
End: 2024-12-09

## 2024-12-09 DIAGNOSIS — K50.00 CROHN'S DISEASE OF SMALL INTESTINE WITHOUT COMPLICATION (HCC): ICD-10-CM

## 2024-12-09 DIAGNOSIS — A04.8 HELICOBACTER PYLORI INFECTION: Primary | ICD-10-CM

## 2024-12-09 PROCEDURE — 88305 TISSUE EXAM BY PATHOLOGIST: CPT | Performed by: PATHOLOGY

## 2024-12-09 PROCEDURE — 88342 IMHCHEM/IMCYTCHM 1ST ANTB: CPT | Performed by: PATHOLOGY

## 2024-12-09 NOTE — RESULT ENCOUNTER NOTE
Please call the patient with the results    Stomach biopsy shows infection with H pylori. This is a bacteria that can live in the stomach and cause abdominal pain and ulcers.  Please send H. pylori treatment per protocol.    Please let her know that the biopsies of her small bowel and colon showed mild active inflammation from Crohn's disease as we discussed after the procedure.  I have ordered her infliximab level testing that she should have just prior to her next infusion in January.  She is scheduled for an office visit with me in February to review the results.  Given her active inflammation, please place her on a high-priority cancellation list for a follow-up visit if a sooner appointment becomes available.  I have also ordered a updated MRI which she should have completed at least 2 to 3 weeks prior to her office visit.  Thank you.

## 2024-12-10 ENCOUNTER — TELEPHONE (OUTPATIENT)
Age: 29
End: 2024-12-10

## 2024-12-10 DIAGNOSIS — A04.8 HELICOBACTER PYLORI INFECTION: Primary | ICD-10-CM

## 2024-12-10 RX ORDER — CLARITHROMYCIN 500 MG/1
500 TABLET ORAL EVERY 12 HOURS SCHEDULED
Qty: 28 TABLET | Refills: 0 | Status: SHIPPED | OUTPATIENT
Start: 2024-12-10 | End: 2024-12-24

## 2024-12-10 RX ORDER — AMOXICILLIN 500 MG/1
1000 CAPSULE ORAL EVERY 12 HOURS SCHEDULED
Qty: 56 CAPSULE | Refills: 0 | Status: SHIPPED | OUTPATIENT
Start: 2024-12-10 | End: 2024-12-24

## 2024-12-10 RX ORDER — TETRACYCLINE HYDROCHLORIDE 500 MG/1
CAPSULE ORAL
Qty: 56 CAPSULE | Refills: 0 | Status: SHIPPED | OUTPATIENT
Start: 2024-12-10 | End: 2024-12-10

## 2024-12-10 RX ORDER — BISMUTH SUBSALICYLATE 262 MG/1
TABLET, CHEWABLE ORAL
Qty: 56 TABLET | Refills: 0 | Status: CANCELLED | OUTPATIENT
Start: 2024-12-10

## 2024-12-10 RX ORDER — METRONIDAZOLE 250 MG/1
TABLET ORAL
Qty: 56 TABLET | Refills: 0 | Status: CANCELLED | OUTPATIENT
Start: 2024-12-10 | End: 2024-12-24

## 2024-12-10 RX ORDER — TETRACYCLINE HYDROCHLORIDE 500 MG/1
CAPSULE ORAL
Qty: 56 CAPSULE | Refills: 0 | Status: CANCELLED | OUTPATIENT
Start: 2024-12-10 | End: 2024-12-24

## 2024-12-10 RX ORDER — BISMUTH SUBSALICYLATE 262 MG/1
TABLET, CHEWABLE ORAL
Qty: 56 TABLET | Refills: 0 | Status: SHIPPED | OUTPATIENT
Start: 2024-12-10 | End: 2024-12-10

## 2024-12-10 RX ORDER — METRONIDAZOLE 250 MG/1
TABLET ORAL
Qty: 56 TABLET | Refills: 0 | Status: SHIPPED | OUTPATIENT
Start: 2024-12-10 | End: 2024-12-10

## 2024-12-10 NOTE — TELEPHONE ENCOUNTER
Patient with H-Pylori.Quad therapy ordered.Patient states she has taken Pepto Bismol in the past with no allergic reaction.Please send medication to the pharmacy.

## 2024-12-10 NOTE — TELEPHONE ENCOUNTER
----- Message from Jessi HOWELL sent at 12/9/2024 10:26 AM EST -----  Ryan  ----- Message -----  From: Diana M Jaiyeola, MD  Sent: 12/9/2024  10:15 AM EST  To: Gastroenterology [facility]    Please call the patient with the results    Stomach biopsy shows infection with H pylori. This is a bacteria that can live in the stomach and cause abdominal pain and ulcers.  Please send H. pylori treatment per protocol.    Please let her know that the biopsies of her small bowel and colon showed mild active inflammation from Crohn's disease as we discussed after the procedure.  I have ordered her infliximab level testing that she should have just prior to her next infusion in January.  She is scheduled for an office visit with me in February to review the results.  Given her active inflammation, please place her on a high-priority cancellation list for a follow-up visit if a sooner appointment becomes available.  I have also ordered a updated MRI which she should have completed at least 2 to 3 weeks prior to her office visit.  Thank you.

## 2024-12-20 ENCOUNTER — TRANSCRIBE ORDERS (OUTPATIENT)
Dept: GASTROENTEROLOGY | Facility: CLINIC | Age: 29
End: 2024-12-20

## 2024-12-20 ENCOUNTER — TELEPHONE (OUTPATIENT)
Dept: GASTROENTEROLOGY | Facility: CLINIC | Age: 29
End: 2024-12-20

## 2025-01-07 ENCOUNTER — TELEPHONE (OUTPATIENT)
Dept: GASTROENTEROLOGY | Facility: MEDICAL CENTER | Age: 30
End: 2025-01-07

## 2025-01-07 NOTE — TELEPHONE ENCOUNTER
Called and spoke to the patient and relayed the reminder. Confirmed with the patient that the orders were placed, as she has had issues having them drawn in the past. Patient verbalized understanding and thanked us.     ----- Message from Diana Jaiyeola, MD sent at 1/7/2025  9:35 AM EST -----  Hi,    This patient had an egd/colonoscopy with me in December and is scheduled for a follow up appointment in February. Can you please contact her to remind her that she should have the remicade level and other labsdrawn just prior to receiving her next infusion so that we can review the results at her office visit.    Thanks!  Dr. Jaiyeola

## 2025-01-17 ENCOUNTER — APPOINTMENT (OUTPATIENT)
Dept: LAB | Facility: IMAGING CENTER | Age: 30
End: 2025-01-17
Payer: COMMERCIAL

## 2025-01-17 DIAGNOSIS — A04.8 HELICOBACTER PYLORI INFECTION: ICD-10-CM

## 2025-01-17 PROCEDURE — 87338 HPYLORI STOOL AG IA: CPT

## 2025-01-20 LAB — H PYLORI AG STL QL IA: NEGATIVE

## 2025-01-21 ENCOUNTER — RESULTS FOLLOW-UP (OUTPATIENT)
Age: 30
End: 2025-01-21

## 2025-02-10 ENCOUNTER — OFFICE VISIT (OUTPATIENT)
Dept: GASTROENTEROLOGY | Facility: MEDICAL CENTER | Age: 30
End: 2025-02-10
Payer: COMMERCIAL

## 2025-02-10 VITALS
SYSTOLIC BLOOD PRESSURE: 124 MMHG | HEART RATE: 91 BPM | HEIGHT: 60 IN | WEIGHT: 143 LBS | OXYGEN SATURATION: 95 % | DIASTOLIC BLOOD PRESSURE: 76 MMHG | TEMPERATURE: 98.5 F | BODY MASS INDEX: 28.07 KG/M2

## 2025-02-10 DIAGNOSIS — A04.8 HELICOBACTER PYLORI INFECTION: ICD-10-CM

## 2025-02-10 DIAGNOSIS — K50.80 CROHN'S DISEASE OF BOTH SMALL AND LARGE INTESTINE WITHOUT COMPLICATION (HCC): Primary | ICD-10-CM

## 2025-02-10 PROCEDURE — 99214 OFFICE O/P EST MOD 30 MIN: CPT | Performed by: INTERNAL MEDICINE

## 2025-02-10 RX ORDER — KETOCONAZOLE 20 MG/ML
SHAMPOO, SUSPENSION TOPICAL ONCE
COMMUNITY
Start: 2024-12-18

## 2025-02-10 NOTE — PROGRESS NOTES
Name: Jodi Pickard      : 1995      MRN: [id number]  Encounter Provider: Diana M Jaiyeola, MD  Encounter Date: 2/10/2025   Encounter department[facility]  :  Assessment & Plan  Crohn's disease of both small and large intestine without complication (HCC)  She has history of ileocolonic Crohn's disease diagnosed in  and has been maintained on infliximab 10 mg/kg every 6 weeks.  She was initially on every 4-week dosing however in  her infliximab levels were low with no evidence of antibodies and she was transition from every 8 weeks to every 6 weeks.  Her recent colonoscopy showed ongoing active disease of her she overall remains asymptomatic.  I discussed the importance of inducing and maintaining remission for Crohn's disease to reduce symptoms and minimize risk of colon cancer complications due to ongoing inflammation.  She will obtain her infliximab level just prior to her next infusion.  If her infliximab levels are low with no evidence of antibody we will plan to increase infliximab to 10 mg/kg every 4 weeks.  If she has therapeutic levels and/or significant antibody formation she would require transition to an alternative class, consider Luis Antonio inhibitors such as  Rinvoq.  She is due for lab work including quant gold, chronic otitis panel, CBC and hepatic function panel        - Continue treatment with infliximab 10 mg/kg every 6-week  - Obtain laboratory work prior to her next infusion  - Your next colonoscopy is due 2025    Health maintenance:  - Yearly flu shot. Avoid live vaccines  - Pneumovax and Prevnar 13 every 5 years  - Routine pap smear  - Yearly skin exam     Return to the office in 4 to 5 months       Orders:    Quantiferon TB Gold Plus Assay; Future    Infliximab Concentration and Anti-Infliximab Antibody; Future    Hepatic function panel; Future    CBC and differential; Future    Chronic Hepatitis Panel; Future    Helicobacter  pylori infection  She was found to have H. pylori infection on recent gastric biopsies and completed antibiotic treatment with subsequent stool testing confirming eradication.           History of Present Illness   HPI  Jodi Pickard is a 29 y.o. female who presents for follow-up evaluation.  She has a history of ileocolonic Crohn's disease on infliximab 10 mg/kg every 6 weeks.    She reports feeling overall well.  She completed her H. pylori treatment her prior nausea resolved.  She is having 1-2 semisolid bowel movements per day.  When she does not take her iron her stools become more loose.  She denies rectal bleeding or abdominal pain.  Her appetite is good and her weight is stable.            12/2024 EGD showed gastritis.  Gastric biopsies were positive for H. pylori.  She was treated with a course of antibiotics and her subsequent stool antigen testing was negative  12/2024 colonoscopy showed moderate colitis from the descending colon to the cecum, mild colitis in the sigmoid, rectosigmoid mucosa with scarred mucosa.  Her ileal biopsy showed mild active enteritis, chronic colitis with mild activity throughout the colon other than at 10 cm and rectal biopsies which were normal.    2/2024 MR enterography showed no acute process        IBD history:  She was diagnosed with Crohn's disease in 2012    Initial treatment included infliximab (Remicade) and transition to Avsola due to insurance reasons in 2024.  She reports no other treatments with biologic, steroid, mesalamine or immunomodulators in the past    She is a family history of her mother with severe Crohn's disease as well  She has no history of perianal disease or extraintestinal manifestation    12/2023 infliximab level 3.2 with no antibody    2016 colonoscopy showed pancolitis multiple pseudopolyps.  Pathology is unavailable for review  2020 pathology showed normal terminal ileum, right sided mild to moderate active colitis, normal transverse colon,  left colon and rectum.    2021 biopsy showed normal terminal ileum, juvenile polyp, ascending and transverse, descending colitis otherwise normal pathology  2023 colonoscopy showed normal terminal ileum cecum, ascending, transverse, descending and rectum biopsies with colitis, cryptitis        Review of Systems   Constitutional:  Negative for chills and fever.   HENT:  Negative for ear pain and sore throat.    Eyes:  Negative for pain and visual disturbance.   Respiratory:  Negative for cough and shortness of breath.    Cardiovascular:  Negative for chest pain and palpitations.   Gastrointestinal:  Negative for abdominal pain and vomiting.   Genitourinary:  Negative for dysuria and hematuria.   Musculoskeletal:  Negative for arthralgias and back pain.   Skin:  Negative for color change and rash.   Neurological:  Negative for seizures and syncope.   All other systems reviewed and are negative.         Objective   /76 (BP Location: Left arm, Patient Position: Sitting, Cuff Size: Standard)   Pulse 91   Temp 98.5 °F (36.9 °C) (Tympanic)   Ht 5' (1.524 m)   Wt 64.9 kg (143 lb)   SpO2 95%   BMI 27.93 kg/m²      Physical Exam  Vitals and nursing note reviewed.   Constitutional:       General: She is not in acute distress.     Appearance: She is well-developed.   HENT:      Head: Normocephalic and atraumatic.   Eyes:      Conjunctiva/sclera: Conjunctivae normal.   Cardiovascular:      Rate and Rhythm: Normal rate and regular rhythm.      Heart sounds: No murmur heard.  Pulmonary:      Effort: Pulmonary effort is normal. No respiratory distress.      Breath sounds: Normal breath sounds.   Abdominal:      Palpations: Abdomen is soft.      Tenderness: There is no abdominal tenderness.   Musculoskeletal:         General: No swelling.      Cervical back: Neck supple.   Skin:     General: Skin is warm and dry.      Capillary Refill: Capillary refill takes less than 2 seconds.   Neurological:      Mental Status: She is  alert.   Psychiatric:         Mood and Affect: Mood normal.

## 2025-02-24 ENCOUNTER — APPOINTMENT (OUTPATIENT)
Dept: LAB | Facility: IMAGING CENTER | Age: 30
End: 2025-02-24
Payer: COMMERCIAL

## 2025-02-24 DIAGNOSIS — K50.10 CROHN'S DISEASE OF COLON WITHOUT COMPLICATION (HCC): ICD-10-CM

## 2025-02-24 DIAGNOSIS — K50.00 CROHN'S DISEASE OF SMALL INTESTINE WITHOUT COMPLICATION (HCC): ICD-10-CM

## 2025-02-24 DIAGNOSIS — K50.80 CROHN'S DISEASE OF BOTH SMALL AND LARGE INTESTINE WITHOUT COMPLICATION (HCC): ICD-10-CM

## 2025-02-24 LAB
ALBUMIN SERPL BCG-MCNC: 3.9 G/DL (ref 3.5–5)
ALP SERPL-CCNC: 72 U/L (ref 34–104)
ALT SERPL W P-5'-P-CCNC: 16 U/L (ref 7–52)
AST SERPL W P-5'-P-CCNC: 21 U/L (ref 13–39)
BASOPHILS # BLD AUTO: 0.07 THOUSANDS/ÂΜL (ref 0–0.1)
BASOPHILS NFR BLD AUTO: 1 % (ref 0–1)
BILIRUB DIRECT SERPL-MCNC: 0.07 MG/DL (ref 0–0.2)
BILIRUB SERPL-MCNC: 0.33 MG/DL (ref 0.2–1)
EOSINOPHIL # BLD AUTO: 0.21 THOUSAND/ÂΜL (ref 0–0.61)
EOSINOPHIL NFR BLD AUTO: 2 % (ref 0–6)
ERYTHROCYTE [DISTWIDTH] IN BLOOD BY AUTOMATED COUNT: 14.5 % (ref 11.6–15.1)
HBV CORE AB SER QL: NORMAL
HBV CORE IGM SER QL: NORMAL
HBV SURFACE AG SER QL: NORMAL
HCT VFR BLD AUTO: 39.5 % (ref 34.8–46.1)
HCV AB SER QL: NORMAL
HGB BLD-MCNC: 12.2 G/DL (ref 11.5–15.4)
IMM GRANULOCYTES # BLD AUTO: 0.02 THOUSAND/UL (ref 0–0.2)
IMM GRANULOCYTES NFR BLD AUTO: 0 % (ref 0–2)
LYMPHOCYTES # BLD AUTO: 4.05 THOUSANDS/ÂΜL (ref 0.6–4.47)
LYMPHOCYTES NFR BLD AUTO: 42 % (ref 14–44)
MCH RBC QN AUTO: 27.4 PG (ref 26.8–34.3)
MCHC RBC AUTO-ENTMCNC: 30.9 G/DL (ref 31.4–37.4)
MCV RBC AUTO: 89 FL (ref 82–98)
MONOCYTES # BLD AUTO: 0.77 THOUSAND/ÂΜL (ref 0.17–1.22)
MONOCYTES NFR BLD AUTO: 8 % (ref 4–12)
NEUTROPHILS # BLD AUTO: 4.55 THOUSANDS/ÂΜL (ref 1.85–7.62)
NEUTS SEG NFR BLD AUTO: 47 % (ref 43–75)
NRBC BLD AUTO-RTO: 0 /100 WBCS
PLATELET # BLD AUTO: 296 THOUSANDS/UL (ref 149–390)
PMV BLD AUTO: 9.4 FL (ref 8.9–12.7)
PROT SERPL-MCNC: 8 G/DL (ref 6.4–8.4)
RBC # BLD AUTO: 4.45 MILLION/UL (ref 3.81–5.12)
WBC # BLD AUTO: 9.67 THOUSAND/UL (ref 4.31–10.16)

## 2025-02-24 PROCEDURE — 86480 TB TEST CELL IMMUN MEASURE: CPT

## 2025-02-24 PROCEDURE — 86705 HEP B CORE ANTIBODY IGM: CPT

## 2025-02-24 PROCEDURE — 80076 HEPATIC FUNCTION PANEL: CPT

## 2025-02-24 PROCEDURE — 36415 COLL VENOUS BLD VENIPUNCTURE: CPT

## 2025-02-24 PROCEDURE — 85025 COMPLETE CBC W/AUTO DIFF WBC: CPT

## 2025-02-24 PROCEDURE — 86704 HEP B CORE ANTIBODY TOTAL: CPT

## 2025-02-24 PROCEDURE — 87340 HEPATITIS B SURFACE AG IA: CPT

## 2025-02-24 PROCEDURE — 86803 HEPATITIS C AB TEST: CPT

## 2025-02-24 PROCEDURE — 82397 CHEMILUMINESCENT ASSAY: CPT

## 2025-02-24 PROCEDURE — 80230 DRUG ASSAY INFLIXIMAB: CPT

## 2025-02-25 LAB
GAMMA INTERFERON BACKGROUND BLD IA-ACNC: 0.05 IU/ML
M TB IFN-G BLD-IMP: NEGATIVE
M TB IFN-G CD4+ BCKGRND COR BLD-ACNC: 0.01 IU/ML
M TB IFN-G CD4+ BCKGRND COR BLD-ACNC: 0.02 IU/ML
MITOGEN IGNF BCKGRD COR BLD-ACNC: 9.95 IU/ML

## 2025-03-03 NOTE — RESULT ENCOUNTER NOTE
Please call the patient with the results    I sent the result via [employer] but received a notification that it was not viewed.    Your blood work testing for hepatitis and tuberculosis is negative.  Your blood count (hemoglobin) and liver test are overall normal.  I am still awaiting the results of your infliximab level and we will let you know the results when they return.

## 2025-03-05 ENCOUNTER — TELEPHONE (OUTPATIENT)
Dept: GASTROENTEROLOGY | Facility: MEDICAL CENTER | Age: 30
End: 2025-03-05

## 2025-03-05 LAB
INFLIXIMAB AB SERPL-MCNC: <22 NG/ML
INFLIXIMAB SERPL-MCNC: 12 UG/ML

## 2025-03-05 NOTE — TELEPHONE ENCOUNTER
"I called the patient to review the results of her recent infliximab level.  The call went to [facility] and she was instructed to return call to the office.    Her infliximab level is considered \"therapeutic\", however she was found to have active disease on her recent colonoscopy.  Next options can include dose escalation to 10 mg/kg every 4 weeks and repeating fecal calprotectin and colonoscopy in 6 months, adding azathioprine or transitioning to an alternative biologic such as Skyrizi or Rinvoq.  "

## 2025-03-05 NOTE — TELEPHONE ENCOUNTER
Patient returning doctors call.  Would be most interested in trying Rinvoq tablets.  Every 4 weeks does not fit into life with 2 children and does not feel she could do injections.  Please advise.

## 2025-04-17 ENCOUNTER — TELEPHONE (OUTPATIENT)
Age: 30
End: 2025-04-17

## 2025-04-17 NOTE — TELEPHONE ENCOUNTER
Recall colonoscopy letter mailed to pt for scheduling, last colon was in December 2024 6 month recall

## 2025-04-17 NOTE — LETTER
4/17/2025  Jodi Pickard  [address] [place] [id number]      Dear Jodi Pickard,    Review of our records shows you are due for the following:    Colonoscopy    Please call the following office to schedule your appointment:  Please schedule for the Month of June 2025    We look forward to hearing from you.    Sincerely,      
No

## 2025-06-18 DIAGNOSIS — A04.8 HELICOBACTER PYLORI INFECTION: ICD-10-CM

## 2025-06-18 RX ORDER — OMEPRAZOLE 20 MG/1
20 CAPSULE, DELAYED RELEASE ORAL
Qty: 90 CAPSULE | Refills: 0 | Status: SHIPPED | OUTPATIENT
Start: 2025-06-18

## 2025-07-24 ENCOUNTER — TELEPHONE (OUTPATIENT)
Age: 30
End: 2025-07-24

## 2025-07-24 NOTE — TELEPHONE ENCOUNTER
Diana M Jaiyeola, MD to [facility]        7/24/25  3:23 PM  Can you please start prior authorization for Rinvoq for this patient with ileocolonic Crohn's disease, currently on infliximab with active disease and therapeutic levels.

## 2025-07-28 NOTE — TELEPHONE ENCOUNTER
Patient's insurance has denied request for Rinvoq.    Denial reason: Do Not see documentation of therapeutic failure or intolerance to, or contraindication to at least 3 month of therapy with Adalimumab.    Formulary alternatives: Nino Abernathy Yusimry, Genia-FKJP    Please let me know how you would like to proceed. Ty

## 2025-08-08 DIAGNOSIS — K50.10 CROHN'S DISEASE OF COLON WITHOUT COMPLICATION (HCC): Primary | ICD-10-CM

## 2025-08-08 RX ORDER — UPADACITINIB 45 MG/1
45 TABLET, EXTENDED RELEASE ORAL DAILY
Qty: 30 TABLET | Refills: 0 | Status: SHIPPED | OUTPATIENT
Start: 2025-08-08

## 2025-08-08 RX ORDER — UPADACITINIB 30 MG/1
30 TABLET, EXTENDED RELEASE ORAL DAILY
Qty: 30 TABLET | Refills: 0 | Status: SHIPPED | OUTPATIENT
Start: 2025-08-08 | End: 2025-08-08